# Patient Record
Sex: MALE | Race: WHITE | Employment: OTHER | URBAN - METROPOLITAN AREA
[De-identification: names, ages, dates, MRNs, and addresses within clinical notes are randomized per-mention and may not be internally consistent; named-entity substitution may affect disease eponyms.]

---

## 2018-04-19 ENCOUNTER — HOSPITAL ENCOUNTER (EMERGENCY)
Age: 53
Discharge: HOME | End: 2018-04-20
Attending: EMERGENCY MEDICINE
Payer: COMMERCIAL

## 2018-04-19 ENCOUNTER — APPOINTMENT (OUTPATIENT)
Dept: GENERAL RADIOLOGY | Age: 53
End: 2018-04-19
Attending: EMERGENCY MEDICINE
Payer: COMMERCIAL

## 2018-04-19 DIAGNOSIS — R07.9 CHEST PAIN, UNSPECIFIED TYPE: Primary | ICD-10-CM

## 2018-04-19 DIAGNOSIS — R03.0 ELEVATED BLOOD PRESSURE READING: ICD-10-CM

## 2018-04-19 LAB
ALBUMIN SERPL-MCNC: 4.3 G/DL (ref 3.5–5)
ALBUMIN/GLOB SERPL: 1.2 {RATIO} (ref 1.1–2.2)
ALP SERPL-CCNC: 75 U/L (ref 45–117)
ALT SERPL-CCNC: 36 U/L (ref 12–78)
ANION GAP SERPL CALC-SCNC: 7 MMOL/L (ref 5–15)
AST SERPL-CCNC: 20 U/L (ref 15–37)
BASOPHILS # BLD: 0 K/UL (ref 0–0.1)
BASOPHILS NFR BLD: 0 % (ref 0–1)
BILIRUB SERPL-MCNC: 0.5 MG/DL (ref 0.2–1)
BUN SERPL-MCNC: 11 MG/DL (ref 6–20)
BUN/CREAT SERPL: 10 (ref 12–20)
CALCIUM SERPL-MCNC: 9.1 MG/DL (ref 8.5–10.1)
CHLORIDE SERPL-SCNC: 105 MMOL/L (ref 97–108)
CK MB CFR SERPL CALC: 1 % (ref 0–2.5)
CK MB SERPL-MCNC: 1.2 NG/ML (ref 5–25)
CK SERPL-CCNC: 117 U/L (ref 39–308)
CO2 SERPL-SCNC: 28 MMOL/L (ref 21–32)
CREAT SERPL-MCNC: 1.12 MG/DL (ref 0.7–1.3)
D DIMER PPP FEU-MCNC: 0.22 MG/L FEU (ref 0–0.65)
DIFFERENTIAL METHOD BLD: ABNORMAL
EOSINOPHIL # BLD: 0 K/UL (ref 0–0.4)
EOSINOPHIL NFR BLD: 1 % (ref 0–7)
ERYTHROCYTE [DISTWIDTH] IN BLOOD BY AUTOMATED COUNT: 12.5 % (ref 11.5–14.5)
GLOBULIN SER CALC-MCNC: 3.7 G/DL (ref 2–4)
GLUCOSE SERPL-MCNC: 97 MG/DL (ref 65–100)
HCT VFR BLD AUTO: 44.3 % (ref 36.6–50.3)
HGB BLD-MCNC: 14.9 G/DL (ref 12.1–17)
IMM GRANULOCYTES # BLD: 0 K/UL (ref 0–0.04)
IMM GRANULOCYTES NFR BLD AUTO: 1 % (ref 0–0.5)
LYMPHOCYTES # BLD: 1.8 K/UL (ref 0.8–3.5)
LYMPHOCYTES NFR BLD: 20 % (ref 12–49)
MCH RBC QN AUTO: 29.3 PG (ref 26–34)
MCHC RBC AUTO-ENTMCNC: 33.6 G/DL (ref 30–36.5)
MCV RBC AUTO: 87 FL (ref 80–99)
MONOCYTES # BLD: 0.6 K/UL (ref 0–1)
MONOCYTES NFR BLD: 7 % (ref 5–13)
NEUTS SEG # BLD: 6.1 K/UL (ref 1.8–8)
NEUTS SEG NFR BLD: 71 % (ref 32–75)
NRBC # BLD: 0 K/UL (ref 0–0.01)
NRBC BLD-RTO: 0 PER 100 WBC
PLATELET # BLD AUTO: 241 K/UL (ref 150–400)
PMV BLD AUTO: 9.8 FL (ref 8.9–12.9)
POTASSIUM SERPL-SCNC: 4.1 MMOL/L (ref 3.5–5.1)
PROT SERPL-MCNC: 8 G/DL (ref 6.4–8.2)
RBC # BLD AUTO: 5.09 M/UL (ref 4.1–5.7)
SODIUM SERPL-SCNC: 140 MMOL/L (ref 136–145)
TROPONIN I SERPL-MCNC: <0.04 NG/ML
WBC # BLD AUTO: 8.6 K/UL (ref 4.1–11.1)

## 2018-04-19 PROCEDURE — 84484 ASSAY OF TROPONIN QUANT: CPT | Performed by: EMERGENCY MEDICINE

## 2018-04-19 PROCEDURE — 71046 X-RAY EXAM CHEST 2 VIEWS: CPT

## 2018-04-19 PROCEDURE — 99285 EMERGENCY DEPT VISIT HI MDM: CPT

## 2018-04-19 PROCEDURE — 82550 ASSAY OF CK (CPK): CPT | Performed by: EMERGENCY MEDICINE

## 2018-04-19 PROCEDURE — 85379 FIBRIN DEGRADATION QUANT: CPT | Performed by: EMERGENCY MEDICINE

## 2018-04-19 PROCEDURE — 36415 COLL VENOUS BLD VENIPUNCTURE: CPT | Performed by: EMERGENCY MEDICINE

## 2018-04-19 PROCEDURE — 80053 COMPREHEN METABOLIC PANEL: CPT | Performed by: EMERGENCY MEDICINE

## 2018-04-19 PROCEDURE — 93005 ELECTROCARDIOGRAM TRACING: CPT

## 2018-04-19 PROCEDURE — 85025 COMPLETE CBC W/AUTO DIFF WBC: CPT | Performed by: EMERGENCY MEDICINE

## 2018-04-19 PROCEDURE — 74011250637 HC RX REV CODE- 250/637: Performed by: EMERGENCY MEDICINE

## 2018-04-19 RX ORDER — FAMOTIDINE 20 MG/1
20 TABLET, FILM COATED ORAL
Status: COMPLETED | OUTPATIENT
Start: 2018-04-19 | End: 2018-04-19

## 2018-04-19 RX ADMIN — FAMOTIDINE 20 MG: 20 TABLET, FILM COATED ORAL at 22:04

## 2018-04-19 NOTE — ED PROVIDER NOTES
EMERGENCY DEPARTMENT HISTORY AND PHYSICAL EXAM      Date: 4/19/2018  Patient Name: Kt Carolina     PROVIDER IN TRIAGE NOTE:  7:14 PM  LUCÍA Sinclair  has evaluated the patient as the Provider in Triage (PIT) for chest pain. The vital signs and the triage nurse assessment have been reviewed. The patient and any available family have been advised that the appropriate studies have been ordered to initiate the work up based on the clinical presentation during the assessment. The pt has been advised that they will be accommodated in ED bed as soon as possible. The pt has been requested to contact the triage nurse or PIT immediately if they experiences any changes in their condition during this brief waiting period. Written by Sukumar Zimmerman ED Scribe, as dictated by Ghada Burleson .      History of Presenting Illness     Chief Complaint   Patient presents with    Chest Pain     The patient reports chest pain that started two hours ago, reports riding in his truck when it started. Reports pain in neck and right arm. Denies shortness of breath, reports pain is constant. Reports nausea, no vomiting. Reports blurry vision when chest pain started. Numbness or tingling in the neck, and center of back.  Blurred Vision       History Provided By: Patient    HPI: Kt Carolina, 46 y.o. male with PMHx significant for hypothyroidism, anxiety, and arthritis, presents ambulatory to the ED with cc of constant, mild to moderate, mid sternal CP that intermittently radiates up to anterior neck since 4:30 PM when he was getting into his car. He denies any alleviating or exacerbating factors. He denies any associated jaw pain or UE pain despite triage note. He denies any pain on exertion or pleuritic pain. Pt also reports mild anxiety and denies relief with Lorazepam x1 tab. Pt specifically denies any N/V, diaphoresis, syncope, or SOB.  He notes some blurred vision since onset of symptoms, denies field cuts or visual loss.     PCP: Coty Chanel MD    There are no other complaints, changes, or physical findings at this time. Current Facility-Administered Medications   Medication Dose Route Frequency Provider Last Rate Last Dose    metoprolol tartrate (LOPRESSOR) 25 mg tablet              Current Outpatient Prescriptions   Medication Sig Dispense Refill    aspirin 81 mg chewable tablet Take 1 Tab by mouth daily for 14 days. Indications: myocardial infarction prevention 14 Tab 0    metoprolol tartrate (LOPRESSOR) 25 mg tablet Take 1 Tab by mouth two (2) times a day for 14 days. 28 Tab 0    raNITIdine (ZANTAC) 150 mg tablet Take 1 Tab by mouth nightly for 14 days. 14 Tab 0    nitroglycerin (NITROSTAT) 0.4 mg SL tablet Take 1 Tab by mouth every five (5) minutes as needed for Chest Pain. Sit/Lay down then put one tab under the tongue every 5 minutes as needed for chest pain for 3 doses 1 Bottle 0    busPIRone (BUSPAR) 10 mg tablet Take 10 mg by mouth three (3) times daily.  clonazePAM (KLONOPIN) 1 mg tablet Take 1 mg by mouth nightly as needed.  methadone (DOLOPHINE) 10 mg tablet Take 10 mg by mouth every six (6) hours as needed for Pain. 1 AM 2 Noon 1 QHS      citalopram (CELEXA) 20 mg tablet Take  by mouth daily. Past History     Past Medical History:  Past Medical History:   Diagnosis Date    Arthritis     Endocrine disease     Hypothyroid    Hypertension     Ill-defined condition     Timbo's Disease    Ill-defined condition     Anxiety       Past Surgical History:  Past Surgical History:   Procedure Laterality Date    HX ORTHOPAEDIC      Knee    HX ORTHOPAEDIC      Hip       Family History:  History reviewed. No pertinent family history.     Social History:  Social History   Substance Use Topics    Smoking status: Never Smoker    Smokeless tobacco: Never Used    Alcohol use No       Allergies:  No Known Allergies      Review of Systems   Review of Systems   Constitutional: Negative for chills, diaphoresis and fever. HENT: Negative for congestion. Eyes: Negative for visual disturbance. Respiratory: Negative for chest tightness and shortness of breath. Cardiovascular: Positive for chest pain. Negative for leg swelling. Gastrointestinal: Negative for abdominal pain, nausea and vomiting. Endocrine: Negative for polyuria. Genitourinary: Negative for dysuria and frequency. Musculoskeletal: Negative for myalgias. Skin: Negative for color change. Allergic/Immunologic: Negative for immunocompromised state. Neurological: Negative for numbness. Psychiatric/Behavioral: The patient is nervous/anxious. Physical Exam   Physical Exam  Nursing note and vitals reviewed. General appearance: Alert, non-toxic, anxious  Eyes: Pupils equal, round and reactive to light, conjunctiva normal, anicteric sclera. HEENT: Mucous membranes tacky, oropharynx is benign. Pulmonary: Clear to auscultation bilaterally. Cardiac: Normal rate and regular rhythm, no murmurs, gallops, or rubs. 2+ radial pulses. Abdomen: Soft, nontender, nondistended, bowel sounds present. MSK: No pretibial edema, mild TTP anterior chest wall, reproduced with RUE horizontal adduction  Skin: Capillary refill < 3 seconds.   Neuro: Alert, answers questions appropriately,  symmetric, strength intact all extremities, light touch intact all extremities  Written by Loretta Baez, ED Scribe, as dictated by Ibeth Silverio MD.    Diagnostic Study Results     Labs -     Recent Results (from the past 12 hour(s))   EKG, 12 LEAD, INITIAL    Collection Time: 04/19/18  7:16 PM   Result Value Ref Range    Ventricular Rate 84 BPM    Atrial Rate 85 BPM    P-R Interval 148 ms    QRS Duration 94 ms    Q-T Interval 378 ms    QTC Calculation (Bezet) 446 ms    Calculated P Axis 25 degrees    Calculated R Axis 22 degrees    Calculated T Axis 18 degrees    Diagnosis       Normal sinus rhythm  Normal ECG  No previous ECGs available     CBC WITH AUTOMATED DIFF    Collection Time: 04/19/18  7:22 PM   Result Value Ref Range    WBC 8.6 4.1 - 11.1 K/uL    RBC 5.09 4. 10 - 5.70 M/uL    HGB 14.9 12.1 - 17.0 g/dL    HCT 44.3 36.6 - 50.3 %    MCV 87.0 80.0 - 99.0 FL    MCH 29.3 26.0 - 34.0 PG    MCHC 33.6 30.0 - 36.5 g/dL    RDW 12.5 11.5 - 14.5 %    PLATELET 989 311 - 834 K/uL    MPV 9.8 8.9 - 12.9 FL    NRBC 0.0 0  WBC    ABSOLUTE NRBC 0.00 0.00 - 0.01 K/uL    NEUTROPHILS 71 32 - 75 %    LYMPHOCYTES 20 12 - 49 %    MONOCYTES 7 5 - 13 %    EOSINOPHILS 1 0 - 7 %    BASOPHILS 0 0 - 1 %    IMMATURE GRANULOCYTES 1 (H) 0.0 - 0.5 %    ABS. NEUTROPHILS 6.1 1.8 - 8.0 K/UL    ABS. LYMPHOCYTES 1.8 0.8 - 3.5 K/UL    ABS. MONOCYTES 0.6 0.0 - 1.0 K/UL    ABS. EOSINOPHILS 0.0 0.0 - 0.4 K/UL    ABS. BASOPHILS 0.0 0.0 - 0.1 K/UL    ABS. IMM. GRANS. 0.0 0.00 - 0.04 K/UL    DF AUTOMATED     METABOLIC PANEL, COMPREHENSIVE    Collection Time: 04/19/18  7:22 PM   Result Value Ref Range    Sodium 140 136 - 145 mmol/L    Potassium 4.1 3.5 - 5.1 mmol/L    Chloride 105 97 - 108 mmol/L    CO2 28 21 - 32 mmol/L    Anion gap 7 5 - 15 mmol/L    Glucose 97 65 - 100 mg/dL    BUN 11 6 - 20 MG/DL    Creatinine 1.12 0.70 - 1.30 MG/DL    BUN/Creatinine ratio 10 (L) 12 - 20      GFR est AA >60 >60 ml/min/1.73m2    GFR est non-AA >60 >60 ml/min/1.73m2    Calcium 9.1 8.5 - 10.1 MG/DL    Bilirubin, total 0.5 0.2 - 1.0 MG/DL    ALT (SGPT) 36 12 - 78 U/L    AST (SGOT) 20 15 - 37 U/L    Alk.  phosphatase 75 45 - 117 U/L    Protein, total 8.0 6.4 - 8.2 g/dL    Albumin 4.3 3.5 - 5.0 g/dL    Globulin 3.7 2.0 - 4.0 g/dL    A-G Ratio 1.2 1.1 - 2.2     TROPONIN I    Collection Time: 04/19/18  7:22 PM   Result Value Ref Range    Troponin-I, Qt. <0.04 <0.05 ng/mL   CK W/ CKMB & INDEX    Collection Time: 04/19/18  7:22 PM   Result Value Ref Range     39 - 308 U/L    CK - MB 1.2 <3.6 NG/ML    CK-MB Index 1.0 0 - 2.5     D DIMER    Collection Time: 04/19/18 10:15 PM   Result Value Ref Range D-dimer 0.22 0.00 - 0.65 mg/L FEU   TROPONIN I    Collection Time: 04/19/18 11:58 PM   Result Value Ref Range    Troponin-I, Qt. <0.04 <0.05 ng/mL   POC TROPONIN-I    Collection Time: 04/20/18 12:03 AM   Result Value Ref Range    Troponin-I (POC) <0.04 0.00 - 0.08 ng/mL       Radiologic Studies -   XR CHEST PA LAT   Final Result        CT Results  (Last 48 hours)    None        CXR Results  (Last 48 hours)               04/19/18 2324  XR CHEST PA LAT Final result    Impression:  IMPRESSION: No acute abnormality identified. There is a small nodule in the left   midlung may represent a granuloma. Comparison with prior studies would be   helpful. Narrative:  EXAM:  XR CHEST PA LAT       INDICATION:   CHEST PAIN       COMPARISON: None. FINDINGS: PA and lateral radiographs of the chest demonstrate lungs clear of an   acute process. There is a small nodule in the left mid lung. Xavi Dia Heart size is   upper limits of normal.  Bony structures are intact. Medical Decision Making   I am the first provider for this patient. I reviewed the vital signs, available nursing notes, past medical history, past surgical history, family history and social history. Vital Signs-Reviewed the patient's vital signs.   Patient Vitals for the past 12 hrs:   Temp Pulse Resp BP SpO2   04/20/18 0125 - 81 - (!) 149/98 -   04/20/18 0115 - 89 18 (!) 149/98 98 %   04/20/18 0100 - 79 16 (!) 145/96 97 %   04/20/18 0045 - 80 13 (!) 147/93 97 %   04/20/18 0001 - 86 13 (!) 139/92 96 %   04/19/18 2345 - 87 15 (!) 146/102 97 %   04/19/18 2300 - 79 11 (!) 136/97 98 %   04/19/18 2245 - 79 13 (!) 137/96 96 %   04/19/18 2230 - 82 13 129/90 96 %   04/19/18 2215 - 85 12 (!) 130/101 97 %   04/19/18 2210 - 83 11 (!) 154/101 96 %   04/19/18 2200 - 91 13 (!) 157/95 95 %   04/19/18 2145 - 84 9 (!) 150/95 96 %   04/19/18 2130 - 85 13 139/89 96 %   04/19/18 2115 - 85 14 (!) 144/94 96 %   04/19/18 2106 - 79 22 - 96 % 04/19/18 2101 - - - (!) 137/97 -   04/19/18 2046 - - - - 98 %   04/19/18 1914 98.4 °F (36.9 °C) 92 18 (!) 169/97 98 %       EKG interpretation: (Preliminary) 19:16  Rhythm: normal sinus rhythm; and regular . Rate (approx.): 84; Axis: normal; QRS interval: normal ; ST/T wave: no ST elevation, no ST depression;   VA interval 148 ms, QRS 94 ms, QTc 446 ms. Written by oLretta Baez ED Scribe, as dictated by Ibeth Silverio MD.      Records Reviewed: Nursing Notes, Old Medical Records, Previous electrocardiograms, Previous Radiology Studies and Previous Laboratory Studies    Provider Notes (Medical Decision Making):   DDx: Chest wall pain, PUD, GERD, anxiety, ACS; lower suspicion PE, TAD, pericarditis     Ekg/trop negative; pain onset >6 hours, now w/o pain, no symptoms w/ exertion. No sx to suggest pericarditis, doubt PE or TAD, d dimer negative. Etiology unclear, some reproducible sx on exam, pt appears anxious w/ hx of anxiety, no exertional sx in ED. Starting Asa, nitro, beta blocker. HEART score ~3, close outpatient f/u; pt agrees w/ and desires d/c since asymptomatic, ekg & labs negative. Advised careful return precautions. ED Course:   Initial assessment performed. The patients presenting problems have been discussed, and they are in agreement with the care plan formulated and outlined with them. I have encouraged them to ask questions as they arise throughout their visit. Consult Note:  1:08 AM  Carolina Vance. Adan Mendenhall MD spoke with Dr. Isidra Escalona  Specialty: Cardiology  Discussed pts hx, disposition, and available diagnostic and imaging results. Reviewed care plans. Consultant agrees with plans as outlined. States if workup is unremarkable then pt can follow up as outpatient as EKG w/o obvious ischemia, trop negative despite symptoms onset ~4:30 PM.     1:20 AM  Pt reexamined and ambulated around ED without reproduction of symptoms on multiple occasions.  Denies exertional pain or any residual pain at this point. Notes palpations. Agrees with plan for close follow up with cardiology. Will start on daily Asa, metoprolol BID, nitro SL prn any chest pain recurrence. As pt is asymptomatic now, ambulates w/o symptoms, case reviewed w/ cardiology who recommends outpatient f/u, pt & family agree w/ POC. Written by Sylvia Sagastume, ED Scribe, as dictated by Jerel Dooley. Niles Blizzard, MD.    Critical Care Time:   0    Disposition:  DISCHARGE NOTE  1:50 AM  The patient has been re-evaluated and is ready for discharge. Reviewed available results with patient. Counseled pt on diagnosis and care plan. Pt has expressed understanding, and all questions have been answered. Pt agrees with plan and agrees to follow up as recommended, or return to the ED if their symptoms worsen. Discharge instructions have been provided and explained to the pt, along with reasons to return to the ED. PLAN:  1. Current Discharge Medication List      START taking these medications    Details   aspirin 81 mg chewable tablet Take 1 Tab by mouth daily for 14 days. Indications: myocardial infarction prevention  Qty: 14 Tab, Refills: 0      metoprolol tartrate (LOPRESSOR) 25 mg tablet Take 1 Tab by mouth two (2) times a day for 14 days. Qty: 28 Tab, Refills: 0      raNITIdine (ZANTAC) 150 mg tablet Take 1 Tab by mouth nightly for 14 days. Qty: 14 Tab, Refills: 0      nitroglycerin (NITROSTAT) 0.4 mg SL tablet Take 1 Tab by mouth every five (5) minutes as needed for Chest Pain. Sit/Lay down then put one tab under the tongue every 5 minutes as needed for chest pain for 3 doses  Qty: 1 Bottle, Refills: 0         CONTINUE these medications which have NOT CHANGED    Details   busPIRone (BUSPAR) 10 mg tablet Take 10 mg by mouth three (3) times daily. clonazePAM (KLONOPIN) 1 mg tablet Take 1 mg by mouth nightly as needed.     Associated Diagnoses: Chronic back pain      methadone (DOLOPHINE) 10 mg tablet Take 10 mg by mouth every six (6) hours as needed for Pain. 1 AM 2 Noon 1 [facility]    Associated Diagnoses: Chronic back pain      citalopram (CELEXA) 20 mg tablet Take  by mouth daily. Associated Diagnoses: Adjustment disorder with anxiety           2. Follow-up Information     Follow up With Details Comments [address] Rachele Jack MD Schedule an appointment as soon as possible for a visit in 4 days  [address] [facility]  [phone]      [facility] EMERGENCY DEPT Go in 1 day If symptoms worsen, INCLUDING WORSENING OR DIFFERENT CHEST PAIN, VOMITING, PAIN WITH WALKING, FAINTING, OR OTHER SYMPTOMS THAT CONCERN YOU [address] [place] [address] Dr Niles Iraheta MD Schedule an appointment as soon as possible for a visit in 1 day [address]  [address]           Return to ED if worse     Diagnosis     Clinical Impression:   1. Chest pain, unspecified type    2. Elevated blood pressure reading        Attestations: This note is prepared by Camilo Diaz, acting as Scribe for [employer]. Georgene Scheuermann, MD.    [employer]. Georgene Scheuermann, MD: The scribe's documentation has been prepared under my direction and personally reviewed by me in its entirety. I confirm that the note above accurately reflects all work, treatment, procedures, and medical decision making performed by me.

## 2018-04-20 ENCOUNTER — HOSPITAL ENCOUNTER (OUTPATIENT)
Age: 53
Setting detail: OBSERVATION
Discharge: HOME | End: 2018-04-20
Attending: EMERGENCY MEDICINE | Admitting: INTERNAL MEDICINE
Payer: COMMERCIAL

## 2018-04-20 ENCOUNTER — OFFICE VISIT (OUTPATIENT)
Dept: CARDIOLOGY CLINIC | Age: 53
End: 2018-04-20

## 2018-04-20 ENCOUNTER — APPOINTMENT (OUTPATIENT)
Dept: GENERAL RADIOLOGY | Age: 53
End: 2018-04-20
Attending: EMERGENCY MEDICINE
Payer: COMMERCIAL

## 2018-04-20 ENCOUNTER — TELEPHONE (OUTPATIENT)
Dept: CARDIOLOGY CLINIC | Age: 53
End: 2018-04-20

## 2018-04-20 ENCOUNTER — APPOINTMENT (OUTPATIENT)
Dept: CT IMAGING | Age: 53
End: 2018-04-20
Attending: EMERGENCY MEDICINE
Payer: COMMERCIAL

## 2018-04-20 VITALS
SYSTOLIC BLOOD PRESSURE: 130 MMHG | OXYGEN SATURATION: 98 % | DIASTOLIC BLOOD PRESSURE: 98 MMHG | HEART RATE: 88 BPM | BODY MASS INDEX: 28.35 KG/M2 | WEIGHT: 198 LBS | HEIGHT: 70 IN | RESPIRATION RATE: 18 BRPM

## 2018-04-20 VITALS
HEART RATE: 88 BPM | HEIGHT: 70 IN | BODY MASS INDEX: 28.41 KG/M2 | OXYGEN SATURATION: 98 % | TEMPERATURE: 98.4 F | SYSTOLIC BLOOD PRESSURE: 150 MMHG | RESPIRATION RATE: 15 BRPM | WEIGHT: 198.41 LBS | DIASTOLIC BLOOD PRESSURE: 89 MMHG

## 2018-04-20 VITALS
DIASTOLIC BLOOD PRESSURE: 95 MMHG | RESPIRATION RATE: 18 BRPM | OXYGEN SATURATION: 97 % | SYSTOLIC BLOOD PRESSURE: 155 MMHG | BODY MASS INDEX: 28.35 KG/M2 | HEART RATE: 78 BPM | HEIGHT: 70 IN | WEIGHT: 198 LBS | TEMPERATURE: 97.8 F

## 2018-04-20 DIAGNOSIS — M15.9 PRIMARY OSTEOARTHRITIS INVOLVING MULTIPLE JOINTS: ICD-10-CM

## 2018-04-20 DIAGNOSIS — F43.22 ADJUSTMENT DISORDER WITH ANXIETY: ICD-10-CM

## 2018-04-20 DIAGNOSIS — Z82.49 FAMILY HISTORY OF ISCHEMIC HEART DISEASE: ICD-10-CM

## 2018-04-20 DIAGNOSIS — I10 HYPERTENSION, ESSENTIAL, BENIGN: ICD-10-CM

## 2018-04-20 DIAGNOSIS — Z87.442 HISTORY OF NEPHROLITHIASIS: ICD-10-CM

## 2018-04-20 DIAGNOSIS — E78.5 DYSLIPIDEMIA (HIGH LDL; LOW HDL): ICD-10-CM

## 2018-04-20 DIAGNOSIS — M54.9 CHRONIC MIDLINE BACK PAIN, UNSPECIFIED BACK LOCATION: ICD-10-CM

## 2018-04-20 DIAGNOSIS — G89.29 CHRONIC MIDLINE BACK PAIN, UNSPECIFIED BACK LOCATION: ICD-10-CM

## 2018-04-20 DIAGNOSIS — I20.0 UNSTABLE ANGINA (HCC): Primary | ICD-10-CM

## 2018-04-20 DIAGNOSIS — R07.9 CHEST PAIN WITH HIGH RISK OF ACUTE CORONARY SYNDROME: Primary | ICD-10-CM

## 2018-04-20 DIAGNOSIS — R00.2 RAPID PALPITATIONS: ICD-10-CM

## 2018-04-20 PROBLEM — R07.89 CHEST PAIN, ATYPICAL: Status: ACTIVE | Noted: 2018-04-20

## 2018-04-20 PROBLEM — Q24.5 MYOCARDIAL BRIDGE: Status: ACTIVE | Noted: 2018-04-20

## 2018-04-20 PROBLEM — I25.10 CAD (CORONARY ARTERY DISEASE), NATIVE CORONARY ARTERY: Status: ACTIVE | Noted: 2018-04-20

## 2018-04-20 LAB
ALBUMIN SERPL-MCNC: 4.1 G/DL (ref 3.5–5)
ALBUMIN/GLOB SERPL: 1.1 {RATIO} (ref 1.1–2.2)
ALP SERPL-CCNC: 80 U/L (ref 45–117)
ALT SERPL-CCNC: 34 U/L (ref 12–78)
ANION GAP SERPL CALC-SCNC: 6 MMOL/L (ref 5–15)
AST SERPL-CCNC: 16 U/L (ref 15–37)
ATRIAL RATE: 78 BPM
ATRIAL RATE: 85 BPM
BASOPHILS # BLD: 0 K/UL (ref 0–0.1)
BASOPHILS NFR BLD: 0 % (ref 0–1)
BILIRUB SERPL-MCNC: 0.6 MG/DL (ref 0.2–1)
BUN SERPL-MCNC: 13 MG/DL (ref 6–20)
BUN/CREAT SERPL: 11 (ref 12–20)
CALCIUM SERPL-MCNC: 9.4 MG/DL (ref 8.5–10.1)
CALCULATED P AXIS, ECG09: 25 DEGREES
CALCULATED P AXIS, ECG09: 27 DEGREES
CALCULATED R AXIS, ECG10: 22 DEGREES
CALCULATED R AXIS, ECG10: 24 DEGREES
CALCULATED T AXIS, ECG11: 18 DEGREES
CALCULATED T AXIS, ECG11: 22 DEGREES
CHLORIDE SERPL-SCNC: 102 MMOL/L (ref 97–108)
CK SERPL-CCNC: 113 U/L (ref 39–308)
CO2 SERPL-SCNC: 29 MMOL/L (ref 21–32)
CREAT SERPL-MCNC: 1.19 MG/DL (ref 0.7–1.3)
DIAGNOSIS, 93000: NORMAL
DIAGNOSIS, 93000: NORMAL
DIFFERENTIAL METHOD BLD: ABNORMAL
EOSINOPHIL # BLD: 0 K/UL (ref 0–0.4)
EOSINOPHIL NFR BLD: 0 % (ref 0–7)
ERYTHROCYTE [DISTWIDTH] IN BLOOD BY AUTOMATED COUNT: 12.4 % (ref 11.5–14.5)
GLOBULIN SER CALC-MCNC: 3.8 G/DL (ref 2–4)
GLUCOSE SERPL-MCNC: 105 MG/DL (ref 65–100)
HCT VFR BLD AUTO: 45 % (ref 36.6–50.3)
HGB BLD-MCNC: 15.3 G/DL (ref 12.1–17)
IMM GRANULOCYTES # BLD: 0 K/UL (ref 0–0.04)
IMM GRANULOCYTES NFR BLD AUTO: 0 % (ref 0–0.5)
LYMPHOCYTES # BLD: 1.2 K/UL (ref 0.8–3.5)
LYMPHOCYTES NFR BLD: 13 % (ref 12–49)
MCH RBC QN AUTO: 29.1 PG (ref 26–34)
MCHC RBC AUTO-ENTMCNC: 34 G/DL (ref 30–36.5)
MCV RBC AUTO: 85.7 FL (ref 80–99)
MONOCYTES # BLD: 0.6 K/UL (ref 0–1)
MONOCYTES NFR BLD: 6 % (ref 5–13)
NEUTS SEG # BLD: 7.6 K/UL (ref 1.8–8)
NEUTS SEG NFR BLD: 80 % (ref 32–75)
NRBC # BLD: 0 K/UL (ref 0–0.01)
NRBC BLD-RTO: 0 PER 100 WBC
P-R INTERVAL, ECG05: 142 MS
P-R INTERVAL, ECG05: 148 MS
PLATELET # BLD AUTO: 245 K/UL (ref 150–400)
PMV BLD AUTO: 9.5 FL (ref 8.9–12.9)
POTASSIUM SERPL-SCNC: 4.2 MMOL/L (ref 3.5–5.1)
PROT SERPL-MCNC: 7.9 G/DL (ref 6.4–8.2)
Q-T INTERVAL, ECG07: 378 MS
Q-T INTERVAL, ECG07: 406 MS
QRS DURATION, ECG06: 92 MS
QRS DURATION, ECG06: 94 MS
QTC CALCULATION (BEZET), ECG08: 446 MS
QTC CALCULATION (BEZET), ECG08: 462 MS
RBC # BLD AUTO: 5.25 M/UL (ref 4.1–5.7)
SODIUM SERPL-SCNC: 137 MMOL/L (ref 136–145)
TROPONIN I BLD-MCNC: <0.04 NG/ML (ref 0–0.08)
TROPONIN I SERPL-MCNC: <0.04 NG/ML
TROPONIN I SERPL-MCNC: <0.04 NG/ML
VENTRICULAR RATE, ECG03: 78 BPM
VENTRICULAR RATE, ECG03: 84 BPM
WBC # BLD AUTO: 9.5 K/UL (ref 4.1–11.1)

## 2018-04-20 PROCEDURE — 99218 HC RM OBSERVATION: CPT

## 2018-04-20 PROCEDURE — 77030010221 HC SPLNT WR POS TELE -B

## 2018-04-20 PROCEDURE — 80053 COMPREHEN METABOLIC PANEL: CPT | Performed by: EMERGENCY MEDICINE

## 2018-04-20 PROCEDURE — 74011250636 HC RX REV CODE- 250/636: Performed by: INTERNAL MEDICINE

## 2018-04-20 PROCEDURE — 74011636320 HC RX REV CODE- 636/320

## 2018-04-20 PROCEDURE — C1769 GUIDE WIRE: HCPCS

## 2018-04-20 PROCEDURE — 74011250637 HC RX REV CODE- 250/637: Performed by: EMERGENCY MEDICINE

## 2018-04-20 PROCEDURE — 99284 EMERGENCY DEPT VISIT MOD MDM: CPT

## 2018-04-20 PROCEDURE — 74011250636 HC RX REV CODE- 250/636

## 2018-04-20 PROCEDURE — C1894 INTRO/SHEATH, NON-LASER: HCPCS

## 2018-04-20 PROCEDURE — 74011000250 HC RX REV CODE- 250

## 2018-04-20 PROCEDURE — 84484 ASSAY OF TROPONIN QUANT: CPT | Performed by: EMERGENCY MEDICINE

## 2018-04-20 PROCEDURE — 84484 ASSAY OF TROPONIN QUANT: CPT

## 2018-04-20 PROCEDURE — 85025 COMPLETE CBC W/AUTO DIFF WBC: CPT | Performed by: EMERGENCY MEDICINE

## 2018-04-20 PROCEDURE — 93005 ELECTROCARDIOGRAM TRACING: CPT

## 2018-04-20 PROCEDURE — 71045 X-RAY EXAM CHEST 1 VIEW: CPT

## 2018-04-20 PROCEDURE — 82550 ASSAY OF CK (CPK): CPT | Performed by: EMERGENCY MEDICINE

## 2018-04-20 PROCEDURE — 77030008543 HC TBNG MON PRSS MRTM -A

## 2018-04-20 PROCEDURE — 99153 MOD SED SAME PHYS/QHP EA: CPT

## 2018-04-20 PROCEDURE — 74011250636 HC RX REV CODE- 250/636: Performed by: EMERGENCY MEDICINE

## 2018-04-20 PROCEDURE — 74011250637 HC RX REV CODE- 250/637: Performed by: NURSE PRACTITIONER

## 2018-04-20 PROCEDURE — 36415 COLL VENOUS BLD VENIPUNCTURE: CPT | Performed by: EMERGENCY MEDICINE

## 2018-04-20 PROCEDURE — 77030019569 HC BND COMPR RAD TERU -B

## 2018-04-20 PROCEDURE — 77030004549 HC CATH ANGI DX PRF MRTM -A

## 2018-04-20 PROCEDURE — 77030015766

## 2018-04-20 PROCEDURE — 96374 THER/PROPH/DIAG INJ IV PUSH: CPT

## 2018-04-20 PROCEDURE — 77030019698 HC SYR ANGI MDLON MRTM -A

## 2018-04-20 RX ORDER — HEPARIN SODIUM 1000 [USP'U]/ML
INJECTION, SOLUTION INTRAVENOUS; SUBCUTANEOUS
Status: COMPLETED
Start: 2018-04-20 | End: 2018-04-20

## 2018-04-20 RX ORDER — LORAZEPAM 2 MG/ML
1 INJECTION INTRAMUSCULAR ONCE
Status: COMPLETED | OUTPATIENT
Start: 2018-04-20 | End: 2018-04-20

## 2018-04-20 RX ORDER — SODIUM CHLORIDE 9 MG/ML
50 INJECTION, SOLUTION INTRAVENOUS
Status: DISCONTINUED | OUTPATIENT
Start: 2018-04-20 | End: 2018-04-20

## 2018-04-20 RX ORDER — FENTANYL CITRATE 50 UG/ML
INJECTION, SOLUTION INTRAMUSCULAR; INTRAVENOUS
Status: COMPLETED
Start: 2018-04-20 | End: 2018-04-20

## 2018-04-20 RX ORDER — METOPROLOL TARTRATE 25 MG/1
25 TABLET, FILM COATED ORAL
Status: COMPLETED | OUTPATIENT
Start: 2018-04-20 | End: 2018-04-20

## 2018-04-20 RX ORDER — NITROGLYCERIN 0.4 MG/1
0.4 TABLET SUBLINGUAL
Status: CANCELLED | OUTPATIENT
Start: 2018-04-20

## 2018-04-20 RX ORDER — HEPARIN SODIUM 200 [USP'U]/100ML
500 INJECTION, SOLUTION INTRAVENOUS ONCE
Status: COMPLETED | OUTPATIENT
Start: 2018-04-20 | End: 2018-04-20

## 2018-04-20 RX ORDER — RANITIDINE 150 MG/1
150 TABLET, FILM COATED ORAL
Qty: 14 TAB | Refills: 0 | Status: SHIPPED | OUTPATIENT
Start: 2018-04-20 | End: 2018-04-20

## 2018-04-20 RX ORDER — METOPROLOL TARTRATE 25 MG/1
25 TABLET, FILM COATED ORAL 2 TIMES DAILY
Qty: 180 TAB | Refills: 0 | Status: ON HOLD | OUTPATIENT
Start: 2018-04-20 | End: 2018-11-14

## 2018-04-20 RX ORDER — SODIUM CHLORIDE 0.9 % (FLUSH) 0.9 %
5-10 SYRINGE (ML) INJECTION AS NEEDED
Status: DISCONTINUED | OUTPATIENT
Start: 2018-04-20 | End: 2018-04-21 | Stop reason: HOSPADM

## 2018-04-20 RX ORDER — SODIUM CHLORIDE 0.9 % (FLUSH) 0.9 %
5-10 SYRINGE (ML) INJECTION EVERY 8 HOURS
Status: DISCONTINUED | OUTPATIENT
Start: 2018-04-20 | End: 2018-04-21 | Stop reason: HOSPADM

## 2018-04-20 RX ORDER — SODIUM CHLORIDE 0.9 % (FLUSH) 0.9 %
5-10 SYRINGE (ML) INJECTION AS NEEDED
Status: CANCELLED | OUTPATIENT
Start: 2018-04-20

## 2018-04-20 RX ORDER — SODIUM CHLORIDE 0.9 % (FLUSH) 0.9 %
10 SYRINGE (ML) INJECTION
Status: DISCONTINUED | OUTPATIENT
Start: 2018-04-20 | End: 2018-04-20

## 2018-04-20 RX ORDER — HEPARIN SODIUM 200 [USP'U]/100ML
INJECTION, SOLUTION INTRAVENOUS
Status: COMPLETED
Start: 2018-04-20 | End: 2018-04-20

## 2018-04-20 RX ORDER — LIDOCAINE HYDROCHLORIDE 10 MG/ML
INJECTION, SOLUTION EPIDURAL; INFILTRATION; INTRACAUDAL; PERINEURAL
Status: COMPLETED
Start: 2018-04-20 | End: 2018-04-20

## 2018-04-20 RX ORDER — LORAZEPAM 1 MG/1
1 TABLET ORAL
Status: CANCELLED | OUTPATIENT
Start: 2018-04-20

## 2018-04-20 RX ORDER — VERAPAMIL HYDROCHLORIDE 2.5 MG/ML
2.5 INJECTION, SOLUTION INTRAVENOUS ONCE
Status: COMPLETED | OUTPATIENT
Start: 2018-04-20 | End: 2018-04-20

## 2018-04-20 RX ORDER — METOPROLOL TARTRATE 25 MG/1
25 TABLET, FILM COATED ORAL 2 TIMES DAILY
Qty: 28 TAB | Refills: 0 | Status: ON HOLD | OUTPATIENT
Start: 2018-04-20 | End: 2018-04-20

## 2018-04-20 RX ORDER — LIDOCAINE HYDROCHLORIDE 10 MG/ML
1-30 INJECTION, SOLUTION EPIDURAL; INFILTRATION; INTRACAUDAL; PERINEURAL
Status: DISCONTINUED | OUTPATIENT
Start: 2018-04-20 | End: 2018-04-20

## 2018-04-20 RX ORDER — LORAZEPAM 1 MG/1
1 TABLET ORAL
Status: ON HOLD | COMMUNITY
End: 2018-11-14

## 2018-04-20 RX ORDER — SODIUM CHLORIDE 0.9 % (FLUSH) 0.9 %
5-10 SYRINGE (ML) INJECTION EVERY 8 HOURS
Status: CANCELLED | OUTPATIENT
Start: 2018-04-20

## 2018-04-20 RX ORDER — GUAIFENESIN 100 MG/5ML
81 LIQUID (ML) ORAL DAILY
Status: CANCELLED | OUTPATIENT
Start: 2018-04-21

## 2018-04-20 RX ORDER — ATORVASTATIN CALCIUM 20 MG/1
20 TABLET, FILM COATED ORAL DAILY
Qty: 90 TAB | Refills: 0 | Status: ON HOLD | OUTPATIENT
Start: 2018-04-20 | End: 2018-11-14

## 2018-04-20 RX ORDER — MIDAZOLAM HYDROCHLORIDE 1 MG/ML
INJECTION, SOLUTION INTRAMUSCULAR; INTRAVENOUS
Status: DISCONTINUED
Start: 2018-04-20 | End: 2018-04-20

## 2018-04-20 RX ORDER — HEPARIN SODIUM 1000 [USP'U]/ML
1000-10000 INJECTION, SOLUTION INTRAVENOUS; SUBCUTANEOUS
Status: DISCONTINUED | OUTPATIENT
Start: 2018-04-20 | End: 2018-04-20

## 2018-04-20 RX ORDER — FENTANYL CITRATE 50 UG/ML
25-50 INJECTION, SOLUTION INTRAMUSCULAR; INTRAVENOUS
Status: DISCONTINUED | OUTPATIENT
Start: 2018-04-20 | End: 2018-04-20

## 2018-04-20 RX ORDER — NITROGLYCERIN 0.4 MG/1
0.4 TABLET SUBLINGUAL
Qty: 1 BOTTLE | Refills: 0 | Status: ON HOLD | OUTPATIENT
Start: 2018-04-20 | End: 2018-11-14

## 2018-04-20 RX ORDER — FENTANYL CITRATE 50 UG/ML
INJECTION, SOLUTION INTRAMUSCULAR; INTRAVENOUS
Status: DISCONTINUED
Start: 2018-04-20 | End: 2018-04-20

## 2018-04-20 RX ORDER — RANITIDINE 150 MG/1
150 TABLET, FILM COATED ORAL 2 TIMES DAILY
Qty: 180 TAB | Refills: 0 | Status: ON HOLD | OUTPATIENT
Start: 2018-04-20 | End: 2018-11-14

## 2018-04-20 RX ORDER — VERAPAMIL HYDROCHLORIDE 2.5 MG/ML
INJECTION, SOLUTION INTRAVENOUS
Status: COMPLETED
Start: 2018-04-20 | End: 2018-04-20

## 2018-04-20 RX ORDER — SODIUM CHLORIDE 9 MG/ML
100 INJECTION, SOLUTION INTRAVENOUS CONTINUOUS
Status: CANCELLED | OUTPATIENT
Start: 2018-04-20 | End: 2018-04-21

## 2018-04-20 RX ORDER — METOPROLOL TARTRATE 25 MG/1
25 TABLET, FILM COATED ORAL 2 TIMES DAILY
Status: CANCELLED | OUTPATIENT
Start: 2018-04-20

## 2018-04-20 RX ORDER — METHADONE HYDROCHLORIDE 10 MG/1
10 TABLET ORAL
Status: CANCELLED | OUTPATIENT
Start: 2018-04-20

## 2018-04-20 RX ORDER — METOPROLOL TARTRATE 25 MG/1
12.5 TABLET, FILM COATED ORAL EVERY 12 HOURS
Status: DISCONTINUED | OUTPATIENT
Start: 2018-04-20 | End: 2018-04-21 | Stop reason: HOSPADM

## 2018-04-20 RX ORDER — METOPROLOL TARTRATE 25 MG/1
TABLET, FILM COATED ORAL
Status: DISCONTINUED
Start: 2018-04-20 | End: 2018-04-20 | Stop reason: HOSPADM

## 2018-04-20 RX ORDER — MIDAZOLAM HYDROCHLORIDE 1 MG/ML
.5-2 INJECTION, SOLUTION INTRAMUSCULAR; INTRAVENOUS
Status: DISCONTINUED | OUTPATIENT
Start: 2018-04-20 | End: 2018-04-20

## 2018-04-20 RX ORDER — MIDAZOLAM HYDROCHLORIDE 1 MG/ML
INJECTION, SOLUTION INTRAMUSCULAR; INTRAVENOUS
Status: COMPLETED
Start: 2018-04-20 | End: 2018-04-20

## 2018-04-20 RX ORDER — ACETAMINOPHEN 325 MG/1
650 TABLET ORAL
Status: DISCONTINUED | OUTPATIENT
Start: 2018-04-20 | End: 2018-04-21 | Stop reason: HOSPADM

## 2018-04-20 RX ORDER — GUAIFENESIN 100 MG/5ML
81 LIQUID (ML) ORAL DAILY
Qty: 14 TAB | Refills: 0 | Status: SHIPPED | OUTPATIENT
Start: 2018-04-20 | End: 2018-05-04

## 2018-04-20 RX ORDER — KETOROLAC TROMETHAMINE 30 MG/ML
15 INJECTION, SOLUTION INTRAMUSCULAR; INTRAVENOUS
Status: COMPLETED | OUTPATIENT
Start: 2018-04-20 | End: 2018-04-20

## 2018-04-20 RX ORDER — BUSPIRONE HYDROCHLORIDE 10 MG/1
10 TABLET ORAL 3 TIMES DAILY
Status: CANCELLED | OUTPATIENT
Start: 2018-04-20

## 2018-04-20 RX ADMIN — METOPROLOL TARTRATE 25 MG: 25 TABLET ORAL at 01:25

## 2018-04-20 RX ADMIN — VERAPAMIL HYDROCHLORIDE 2.5 MG: 2.5 INJECTION INTRAVENOUS at 16:24

## 2018-04-20 RX ADMIN — HEPARIN SODIUM 1000 UNITS: 200 INJECTION, SOLUTION INTRAVENOUS at 16:08

## 2018-04-20 RX ADMIN — MIDAZOLAM HYDROCHLORIDE 2 MG: 1 INJECTION, SOLUTION INTRAMUSCULAR; INTRAVENOUS at 16:15

## 2018-04-20 RX ADMIN — FENTANYL CITRATE 25 MCG: 50 INJECTION, SOLUTION INTRAMUSCULAR; INTRAVENOUS at 16:00

## 2018-04-20 RX ADMIN — LIDOCAINE HYDROCHLORIDE 1 ML: 10 INJECTION, SOLUTION EPIDURAL; INFILTRATION; INTRACAUDAL; PERINEURAL at 16:22

## 2018-04-20 RX ADMIN — KETOROLAC TROMETHAMINE 15 MG: 30 INJECTION, SOLUTION INTRAMUSCULAR at 00:25

## 2018-04-20 RX ADMIN — HEPARIN SODIUM 2500 UNITS: 1000 INJECTION, SOLUTION INTRAVENOUS; SUBCUTANEOUS at 16:23

## 2018-04-20 RX ADMIN — FENTANYL CITRATE 50 MCG: 50 INJECTION, SOLUTION INTRAMUSCULAR; INTRAVENOUS at 16:19

## 2018-04-20 RX ADMIN — METOPROLOL TARTRATE 12.5 MG: 25 TABLET ORAL at 18:09

## 2018-04-20 RX ADMIN — ACETAMINOPHEN 650 MG: 325 TABLET ORAL at 18:11

## 2018-04-20 RX ADMIN — NITROGLYCERIN 200 MCG: 5 INJECTION, SOLUTION INTRAVENOUS at 16:23

## 2018-04-20 RX ADMIN — MIDAZOLAM HYDROCHLORIDE 2 MG: 1 INJECTION, SOLUTION INTRAMUSCULAR; INTRAVENOUS at 15:59

## 2018-04-20 RX ADMIN — IOPAMIDOL 50 ML: 755 INJECTION, SOLUTION INTRAVENOUS at 16:39

## 2018-04-20 RX ADMIN — MIDAZOLAM 2 MG: 1 INJECTION INTRAMUSCULAR; INTRAVENOUS at 15:59

## 2018-04-20 RX ADMIN — FENTANYL CITRATE 25 MCG: 50 INJECTION, SOLUTION INTRAMUSCULAR; INTRAVENOUS at 16:15

## 2018-04-20 RX ADMIN — VERAPAMIL HYDROCHLORIDE 2.5 MG: 2.5 INJECTION, SOLUTION INTRAVENOUS at 16:24

## 2018-04-20 RX ADMIN — HEPARIN SODIUM 1000 UNITS: 200 INJECTION, SOLUTION INTRAVENOUS at 16:07

## 2018-04-20 RX ADMIN — MIDAZOLAM HYDROCHLORIDE 1 MG: 1 INJECTION, SOLUTION INTRAMUSCULAR; INTRAVENOUS at 16:19

## 2018-04-20 RX ADMIN — LORAZEPAM 1 MG: 2 INJECTION INTRAMUSCULAR; INTRAVENOUS at 20:33

## 2018-04-20 NOTE — PROGRESS NOTES
1. Have you been to the ER, urgent care clinic since your last visit? Hospitalized since your last visit? ED [facility] ER last night for CP. 2. Have you seen or consulted any other health care providers outside of the [address] [place] [address] since your last visit? Include any pap smears or colon screening. No    Chief Complaint   Patient presents with    Chest Pain     C/O CP rest or exertion.      NTG 0.4 mg sl given 1:20 PM

## 2018-04-20 NOTE — H&P
[address], [address] [facility]  [phone]          CARDIOLOGY HISTORY AND PHYSICAL    Date of  Admission: 4/20/2018  2:10 PM     Admission type:Emergency     Subjective:      Best Johnson is a 46 y.o. male admitted for Unstable angina ([facility] [address] No previous cardiac hx, +fam hx with early death from CAD. Seen in [address] [facility] ED last evening, discharged after neg troponin x 2 and neg ECG. Scheduled to see Dr. José Miguel Mayen this AM.  Cuca Chun early as he began to have further lower sternal chest discomfort, stabbing pain. Back to ED with neg ECG and troponin x 1. Cardiac risk factors: family history. Patient Active Problem List    Diagnosis Date Noted    Chest pain, atypical 04/20/2018    [facility] 04/20/2018    Family history of ischemic heart disease 04/20/2018    Rapid palpitations 06/07/2016    Dyslipidemia (high LDL; low HDL) 06/27/2014    Chronic back pain 06/27/2014    DJD (degenerative joint disease) 06/27/2014    Hypertension, essential, benign 06/27/2014    Adjustment disorder with anxiety 06/27/2014    History of nephrolithiasis--s/p lithotripsy 06/27/2014    Dizziness 06/27/2014      Ghassan Pruitt MD  Past Medical History:   Diagnosis Date    Arthritis     Endocrine disease     Hypothyroid    Hypertension     Ill-defined condition     Timbo's Disease    Ill-defined condition     Anxiety      Social History     Social History    Marital status:      Spouse name: N/A    Number of children: N/A    Years of education: N/A     Social History Main Topics    Smoking status: Never Smoker    Smokeless tobacco: Never Used    Alcohol use No    Drug use: No    Sexual activity: Not on file     Other Topics Concern    Not on file     Social History Narrative     No Known Allergies   No family history on file.    Current Facility-Administered Medications   Medication Dose Route Frequency    metoprolol tartrate (LOPRESSOR) tablet 12.5 mg  12.5 mg Oral Q12H         Review of Symptoms:  Constitutional: negative  Eyes: negative  Ears, nose, mouth, throat, and face: negative  Respiratory: negative  Cardiovascular: new onset of stabbing chest pain, lower sternum   Gastrointestinal: negative  Genitourinary:negative  Musculoskeletal:chronic back pain  Neurological: negative  Behvioral/Psych: negative  Endocrine: negative     Objective:   Physical Exam:  General Appearance:  WNWD  male in no acute distress  Chest:   Clear  Cardiovascular:  Regular rate and rhythm, no murmur.   Abdomen:   Soft, non-tender, bowel sounds are active.   Extremities: no peripheral edema  Skin:  Warm and dry.     Visit Vitals    /85 (BP 1 Location: Right arm, BP Patient Position: At rest)    Pulse 75    Temp 98 °F (36.7 °C)    Resp 22    Ht 5' 10\" (1.778 m)    Wt 198 lb (89.8 kg)    SpO2 98%    BMI 28.41 kg/m2       Cardiographics    Telemetry: SR-ST  ECG: SR no acute change      Labs:  Recent Labs      04/20/18   1424  04/19/18   1922   WBC  9.5  8.6   HGB  15.3  14.9   HCT  45.0  44.3   PLT  245  241     Recent Labs      04/20/18   1424  04/19/18   1922   NA  137  140   K  4.2  4.1   CL  102  105   CO2  29  28   GLU  105*  97   BUN  13  11   CREA  1.19  1.12   CA  9.4  9.1   ALB  4.1  4.3   TBILI  0.6  0.5   SGOT  16  20   ALT  34  36       Recent Labs      04/20/18   1424  04/19/18   2358  04/19/18   [address]  <0.04  <0.04  <0.04   CPK   --    --   117   CKMB   --    --   1.2          Assessment:       Active Problems:    Dyslipidemia (high LDL; low HDL) (6/27/2014)      Hypertension, essential, benign (6/27/2014)      Unstable angina ([address] (4/20/2018)      Family history of ischemic heart disease (4/20/2018)         Plan:     [place]:  Very concerning with ongoing chest discomfort/pain and strong family hx  Received ASA this AM, giving metoprolol now for BP control.    Dr. Mikey Madden and I discussed the risks/benefits/alternatives of cardiac catheterization +/- PCI with the patient. Risks include (but are not limited to) bleeding, infection, cva/mi/tamponade/death. The patient understands and wishes to proceed. Patient seen and examined by me with nurse practitioner Tila Alvarez. I personally performed all components of the history, physical, and medical decision making and agree with the assessment and plan with minor modifications as noted.

## 2018-04-20 NOTE — DISCHARGE INSTRUCTIONS
Elevated Blood Pressure: Care Instructions  Your Care Instructions    Blood pressure is a measure of how hard the blood pushes against the walls of your arteries. It's normal for blood pressure to go up and down throughout the day. But if it stays up over time, you have high blood pressure. Two numbers tell you your blood pressure. The first number is the systolic pressure. It shows how hard the blood pushes when your heart is pumping. The second number is the diastolic pressure. It shows how hard the blood pushes between heartbeats, when your heart is relaxed and filling with blood. An ideal blood pressure in adults is less than 120/80 (say \"120 over 80\"). High blood pressure is 140/90 or higher. You have high blood pressure if your top number is 140 or higher or your bottom number is 90 or higher, or both. The main test for high blood pressure is simple, fast, and painless. To diagnose high blood pressure, your doctor will test your blood pressure at different times. After testing your blood pressure, your doctor may ask you to test it again when you are home. If you are diagnosed with high blood pressure, you can work with your doctor to make a long-term plan to manage it. Follow-up care is a key part of your treatment and safety. Be sure to make and go to all appointments, and call your doctor if you are having problems. It's also a good idea to know your test results and keep a list of the medicines you take. How can you care for yourself at home? · Do not smoke. Smoking increases your risk for heart attack and stroke. If you need help quitting, talk to your doctor about stop-smoking programs and medicines. These can increase your chances of quitting for good. · Stay at a healthy weight. · Try to limit how much sodium you eat to less than 2,300 milligrams (mg) a day. Your doctor may ask you to try to eat less than 1,500 mg a day. · Be physically active.  Get at least 30 minutes of exercise on most days of the week. Walking is a good choice. You also may want to do other activities, such as running, swimming, cycling, or playing tennis or team sports. · Avoid or limit alcohol. Talk to your doctor about whether you can drink any alcohol. · Eat plenty of fruits, vegetables, and low-fat dairy products. Eat less saturated and total fats. · Learn how to check your blood pressure at home. When should you call for help? Call your doctor now or seek immediate medical care if:  ? · Your blood pressure is much higher than normal (such as 180/110 or higher). ? · You think high blood pressure is causing symptoms such as:  ¨ Severe headache. ¨ Blurry vision. ? Watch closely for changes in your health, and be sure to contact your doctor if:  ? · You do not get better as expected. Where can you learn more? Go to http://fl[facility] Medicalmoises.info/. Enter B997 in the search box to learn more about \"Elevated Blood Pressure: Care Instructions. \"  Current as of: September 21, 2016  Content Version: 11.4  © 5896-7907 [employer]. Care instructions adapted under license by [employer] (which disclaims liability or warranty for this information). If you have questions about a medical condition or this instruction, always ask your healthcare professional. [address] any warranty or liability for your use of this information. Chest Pain: Care Instructions  Your Care Instructions    There are many things that can cause chest pain. Some are not serious and will get better on their own in a few days. But some kinds of chest pain need more testing and treatment. Your doctor may have recommended a follow-up visit in the next 8 to 12 hours. If you are not getting better, you may need more tests or treatment. Even though your doctor has released you, you still need to watch for any problems.  The doctor carefully checked you, but sometimes problems can develop later. If you have new symptoms or if your symptoms do not get better, get medical care right away. If you have worse or different chest pain or pressure that lasts more than 5 minutes or you passed out (lost consciousness), call 911 or seek other emergency help right away. A medical visit is only one step in your treatment. Even if you feel better, you still need to do what your doctor recommends, such as going to all suggested follow-up appointments and taking medicines exactly as directed. This will help you recover and help prevent future problems. How can you care for yourself at home? · Rest until you feel better. · Take your medicine exactly as prescribed. Call your doctor if you think you are having a problem with your medicine. · Do not drive after taking a prescription pain medicine. When should you call for help? Call 911 if:  ? · You passed out (lost consciousness). ? · You have severe difficulty breathing. ? · You have symptoms of a heart attack. These may include:  ¨ Chest pain or pressure, or a strange feeling in your chest.  ¨ Sweating. ¨ Shortness of breath. ¨ Nausea or vomiting. ¨ Pain, pressure, or a strange feeling in your back, neck, jaw, or upper belly or in one or both shoulders or arms. ¨ Lightheadedness or sudden weakness. ¨ A fast or irregular heartbeat. After you call 911, the  may tell you to chew 1 adult-strength or 2 to 4 low-dose aspirin. Wait for an ambulance. Do not try to drive yourself. ?Call your doctor today if:  ? · You have any trouble breathing. ? · Your chest pain gets worse. ? · You are dizzy or lightheaded, or you feel like you may faint. ? · You are not getting better as expected. ? · You are having new or different chest pain. Where can you learn more? Go to http://floyd-moises.info/. Enter A120 in the search box to learn more about \"Chest Pain: Care Instructions. \"  Current as of: March 20, 2017  Content Version: 11.4  © 5140-8196 [employer]. Care instructions adapted under license by [employer] (which disclaims liability or warranty for this information). If you have questions about a medical condition or this instruction, always ask your healthcare professional. [address] any warranty or liability for your use of this information.

## 2018-04-20 NOTE — PROGRESS NOTES
[id number] [facility]        [phone]                             NEW PATIENT HPI/FOLLOW-UP    NAME:  Uvaldo García   :   1965   MRN:   [id number]   PCP:  Bertha Steinberg MD           Subjective: The patient is a 46y.o. year old male non-smoker with PMHx of TAMIE/panic attacks,adjustment disorder since childhood requiring maintenance Rx, dyslipidemia with low HDL, FHx early CAD/mi/sudden death, osteoarthritis--on methadone for chronic back pain, hypothyroidism, HTN  who presents for evaluation of intermittent recurring substernal chest pain radiating into left arm and into throat and lower jaw over last few days/weeks. Is associated with \"pounding\" heart beats. Occurs at anytime but usually with activity. Duration variable lasting upwards for minutes to hrs. Was evaluated in ED [facility] yesterday. W/U negative. Discharged after 6-8 hrs. Chest pain has persisted on and off through the night. Advised to present here today for further tx. Denies edema, medication intolerance, palpitations, shortness of breath, PND/orthopnea wheezing, sputum, syncope, dizziness or light headedness. Review of Systems  General: Pt denies excessive weight gain or loss. Pt is able to conduct ADL's. Respiratory: Denies shortness of breath, MANDUJANO, wheezing or stridor.   Cardiovascular: +precordial pain, -palpitations, edema or PND  Gastrointestinal: Denies poor appetite, +indigestion, -abdominal pain or blood in stool  Peripheral vascular: Denies claudication, leg cramps  Neurological: Denies paresthesias, tingling.numbness  Psychiatric: ++++anxiety,depression,fatigue  Musculoskeletal: + diffuse pain,tenderness, soreness,-swelling      Past Medical History:   Diagnosis Date    Arthritis     Endocrine disease     Hypothyroid    Hypertension     Ill-defined condition     Timbo's Disease    Ill-defined condition     Anxiety     Patient Active Problem List    Diagnosis Date Noted  Chest pain, atypical 04/20/2018    Rapid palpitations 06/07/2016    Dyslipidemia (high LDL; low HDL) 06/27/2014    Chronic back pain 06/27/2014    DJD (degenerative joint disease) 06/27/2014    Hypertension, essential, benign 06/27/2014    Adjustment disorder with anxiety 06/27/2014    History of nephrolithiasis--s/p lithotripsy 06/27/2014    Dizziness 06/27/2014      Past Surgical History:   Procedure Laterality Date    HX ORTHOPAEDIC      Knee    HX ORTHOPAEDIC      Hip     No Known Allergies   No family history on file. Social History     Social History    Marital status:      Spouse name: N/A    Number of children: N/A    Years of education: N/A     Occupational History    Not on file. Social History Main Topics    Smoking status: Never Smoker    Smokeless tobacco: Never Used    Alcohol use No    Drug use: No    Sexual activity: Not on file     Other Topics Concern    Not on file     Social History Narrative      Current Outpatient Prescriptions   Medication Sig    aspirin 81 mg chewable tablet Take 1 Tab by mouth daily for 14 days. Indications: myocardial infarction prevention    metoprolol tartrate (LOPRESSOR) 25 mg tablet Take 1 Tab by mouth two (2) times a day for 14 days.  raNITIdine (ZANTAC) 150 mg tablet Take 1 Tab by mouth nightly for 14 days.  nitroglycerin (NITROSTAT) 0.4 mg SL tablet Take 1 Tab by mouth every five (5) minutes as needed for Chest Pain. Sit/Lay down then put one tab under the tongue every 5 minutes as needed for chest pain for 3 doses    busPIRone (BUSPAR) 10 mg tablet Take 10 mg by mouth three (3) times daily.  clonazePAM (KLONOPIN) 1 mg tablet Take 1 mg by mouth nightly as needed.  methadone (DOLOPHINE) 10 mg tablet Take 10 mg by mouth every six (6) hours as needed for Pain. 1 AM 2 Noon 1 QHS    citalopram (CELEXA) 20 mg tablet Take  by mouth daily. No current facility-administered medications for this visit.          I have reviewed the nurses notes, vitals, problem list, allergy list, medical history, family medical, social history and medications. Objective:     Physical Exam:     Vitals:    04/20/18 1301   BP: (!) 130/98   Pulse: 88   Resp: 18   SpO2: 98%   Weight: 198 lb (89.8 kg)   Height: 5' 10\" (1.778 m)    Body mass index is 28.41 kg/(m^2). General: Well developed, very anxious,crying complaining of persistent moderate to severe substernal chest pain. HEENT: No carotid bruits, no JVD, trach is midline. Heart:  Normal S1/S2 negative S3 or S4. Regular, no murmur, gallop or rub.   Respiratory: Clear bilaterally, no wheezing or rales  Abdomen:   Soft, non-tender, bowel sounds are active.   Extremities:  No edema, normal cap refill, no cyanosis. Neuro: A&Ox3, speech clear, gait stable. Skin: Skin color is normal. No rashes or lesions. No diaphoresis.   Vascular: 2+ pulses symmetric in all extremities        Data Review:       Cardiographics:    EKG: NSR,minimal diffuse ST segment elevation suggestive of early repol vs possible early acute epicardial ischemia/injury,pericarditis    Cardiology Labs:    Results for orders placed or performed during the hospital encounter of 04/19/18   EKG, 12 LEAD, INITIAL   Result Value Ref Range    Ventricular Rate 84 BPM    Atrial Rate 85 BPM    P-R Interval 148 ms    QRS Duration 94 ms    Q-T Interval 378 ms    QTC Calculation (Bezet) 446 ms    Calculated P Axis 25 degrees    Calculated R Axis 22 degrees    Calculated T Axis 18 degrees    Diagnosis       Normal sinus rhythm  Normal ECG  No previous ECGs available  Confirmed by Jessenia Garcia ([id number]) on 4/20/2018 8:47:06 AM         Lab Results   Component Value Date/Time    Cholesterol, total 197 06/29/2016 09:35 AM    HDL Cholesterol 35 (L) 06/29/2016 09:35 AM    LDL, calculated 121 (H) 06/29/2016 09:35 AM    Triglyceride 203 (H) 06/29/2016 09:35 AM       Lab Results   Component Value Date/Time    Sodium 140 04/19/2018 07:22 PM Potassium 4.1 04/19/2018 07:22 PM    Chloride 105 04/19/2018 07:22 PM    CO2 28 04/19/2018 07:22 PM    Anion gap 7 04/19/2018 07:22 PM    Glucose 97 04/19/2018 07:22 PM    BUN 11 04/19/2018 07:22 PM    Creatinine 1.12 04/19/2018 07:22 PM    BUN/Creatinine ratio 10 (L) 04/19/2018 07:22 PM    GFR est AA >60 04/19/2018 07:22 PM    GFR est non-AA >60 04/19/2018 07:22 PM    Calcium 9.1 04/19/2018 07:22 PM    Bilirubin, total 0.5 04/19/2018 07:22 PM    AST (SGOT) 20 04/19/2018 07:22 PM    Alk. phosphatase 75 04/19/2018 07:22 PM    Protein, total 8.0 04/19/2018 07:22 PM    Albumin 4.3 04/19/2018 07:22 PM    Globulin 3.7 04/19/2018 07:22 PM    A-G Ratio 1.2 04/19/2018 07:22 PM    ALT (SGPT) 36 04/19/2018 07:22 PM          Assessment:       ICD-10-CM ICD-9-CM    1. Chest pain with high risk of acute coronary syndrome R07.9 786.50 AMB POC EKG ROUTINE W/ 12 LEADS, INTER & REP   2. Dyslipidemia (high LDL; low HDL) E78.5 272.4 AMB POC EKG ROUTINE W/ 12 LEADS, INTER & REP   3. Hypertension, essential, benign I10 401.1 AMB POC EKG ROUTINE W/ 12 LEADS, INTER & REP   4. Rapid palpitations R00.2 785.1 AMB POC EKG ROUTINE W/ 12 LEADS, INTER & REP   5. Chronic midline back pain, unspecified back location M54.9 724.5 AMB POC EKG ROUTINE W/ 12 LEADS, INTER & REP    G89.29 338.29    6. Primary osteoarthritis involving multiple joints M15.0 715.09 AMB POC EKG ROUTINE W/ 12 LEADS, INTER & REP   7. Adjustment disorder with anxiety F43.22 309.24 AMB POC EKG ROUTINE W/ 12 LEADS, INTER & REP         Discussion: Patient presents at this time with persistent intermittent/steady substernal chest tightness with throat and lower jaw radiation worse on exertion since evaluation [facility]. Very anxious. Has hx of marked generalized anxiety and it seems panic attacks. Took himself off Lorazepam 2 weeks ago or so. Stat EKG reveals no acute changes.  However, givn presentation and hx believe we should transfer to ED ED [facility] for consideration semi-emergent cardiac cath. Will summons EMS for transfer. Chest somewhat better after sl NTG. If cardiac cath unremarkable then will need GI and Psychiatric w/u and Rx. Plan: 1. Continue same meds. Lipid profile and labs followed by PCP. 2.Encouraged to exercise to tolerance and follow low fat, low cholesterol, low sodium predominantly Plant-based (consider Mediterranean) diet. Call with questions or concerns. Will follow up any test results by phone and/or f/u here in office if needed. Jose Luis Rodríguez 3.Follow up: 2-3 WEEKS    I have discussed the diagnosis with the patient and the intended plan as seen in the above orders. The patient has received an after-visit summary and questions were answered concerning future plans. I have discussed any concerning medication side effects and warnings with the patient as well.     Marjan Sterling MD  4/20/2018

## 2018-04-20 NOTE — ED NOTES
Verbal shift change report given to KRISTINE Martinez (oncoming nurse) by Nicolasa Mendez RN (offgoing nurse). Report included the following information SBAR, Kardex, ED Summary, [facility] and Recent Results.

## 2018-04-20 NOTE — IP AVS SNAPSHOT
[address] [facility] 
[phone] Patient: James Field MRN: [id number] :1965 About your hospitalization You were admitted on:  2018 You [facility]  [facility] You were discharged on:  2018 Why you were hospitalized Your primary diagnosis was:  Not on File Your diagnoses also included:  Unstable Angina (Hcc), Dyslipidemia (High Ldl; Low Hdl), Hypertension, Essential, Benign, Family History Of Ischemic Heart Disease, Cad (Coronary Artery Disease), Native Coronary Artery, Myocardial Bridge Follow-up Information Follow up With Details Comments Contact Info Hiren Urena MD   [address] [facility] [address] [facility] 
[phone] Arleth Ward MD Schedule an appointment as soon as possible for a visit in 2 weeks  [address] [facility] 
[phone] Discharge Orders None A check piper indicates which time of day the medication should be taken. My Medications START taking these medications Instructions Each Dose to Equal  
 Morning Noon Evening Bedtime  
 atorvastatin 20 mg tablet Commonly known as:  LIPITOR Your last dose was: Your next dose is: Take 1 Tab by mouth daily. Lifelong medicine for cholesterol 20 mg  
    
   
   
   
  
 naloxone 2 mg/actuation Spry Your last dose was: Your next dose is:    
   
   
 Use 1 spray intranasally into 1 nostril. Use a new Narcan nasal spray for subsequent doses and administer into alternating nostrils. May repeat every 2 to 3 minutes as needed. raNITIdine 150 mg tablet Commonly known as:  ZANTAC Your last dose was: Your next dose is: Take 1 Tab by mouth two (2) times a day.  For suspected heartburn- refills per PCP  
 150 mg  
 CONTINUE taking these medications Instructions Each Dose to Equal  
 Morning Noon Evening Bedtime  
 aspirin 81 mg chewable tablet Your last dose was: Your next dose is: Take 1 Tab by mouth daily for 14 days. Indications: myocardial infarction prevention 81 mg  
    
   
   
   
  
 busPIRone 10 mg tablet Commonly known as:  BUSPAR Your last dose was: Your next dose is: Take 10 mg by mouth three (3) times daily. 10 mg LORazepam 1 mg tablet Commonly known as:  ATIVAN Your last dose was: Your next dose is: Take 1 mg by mouth every four (4) hours as needed for Anxiety. 1 mg  
    
   
   
   
  
 methadone 10 mg tablet Commonly known as:  DOLOPHINE Your last dose was: Your next dose is: Take 10 mg by mouth every six (6) hours as needed for Pain. 1 AM 2 Noon 1 QHS 10 mg  
    
   
   
   
  
 metoprolol tartrate 25 mg tablet Commonly known as:  LOPRESSOR Your last dose was: Your next dose is: Take 1 Tab by mouth two (2) times a day. 25 mg  
    
   
   
   
  
 nitroglycerin 0.4 mg SL tablet Commonly known as:  NITROSTAT Your last dose was: Your next dose is: Take 1 Tab by mouth every five (5) minutes as needed for Chest Pain. Sit/Lay down then put one tab under the tongue every 5 minutes as needed for chest pain for 3 doses 0.4 mg Where to Get Your Medications These medications were sent to [address] [address], [address] [place] [address] Phone:  [phone]  
  atorvastatin 20 mg tablet  
 metoprolol tartrate 25 mg tablet  
 raNITIdine 150 mg tablet Information on where to get these meds will be given to you by the nurse or doctor. ! Ask your nurse or doctor about these medications  
  naloxone 2 mg/actuation Spry Opioid Education Prescription Opioids: What You Need to Know: 
 
 
Discharge Physician: Ngozi Ashby MD 
 
Admission Diagnoses:  
Unstable angina [facility]) Discharge Diagnoses: Active Problems: 
  Dyslipidemia (high LDL; low HDL) (6/27/2014) Hypertension, essential, benign (6/27/2014) Unstable angina ([address] (4/20/2018) Family history of ischemic heart disease (4/20/2018) CAD (coronary artery disease), native coronary artery (4/20/2018) Overview: Mild, nonobstructive by cardiac catheterization 4/20/2018 Myocardial bridge (4/20/2018) Overview: 0% narrowing in  diastole, 60% narrowing in systole on cardiac cath 4/20/2018 Discharge Condition: Good Cardiology Procedures this Admission:  Diagnostic left heart catheterization Disposition: home Reference discharge instructions provided by nursing for diet and activity. Signed: 
Ngozi Ashby MD 
4/20/2018 5:18 PM 
 
 
Radial Cardiac Catheterization/Angiography Discharge Instructions It is normal to feel tired the first couple days. Take it easy and follow the physicians instructions. CHECK THE CATHETER INSERTION SITE DAILY: 
 
Remove the wrist dressing 24 hours after the procedure. You may shower 24 hours after the procedure. Wash with soap and water and pat dry.  
Gentle cleaning of the site with soap and water is sufficient, cover with a dry clean dressing or bandage. Do not apply creams or powders to the area. No soaking the wrist for 3 days. Leave the puncture site open to air after 24 hours post-procedure. CALL THE PHYSICIANS:  
 
If the site becomes red, swollen or feels warm to the touch If there is bleeding or drainage or if there is unusual pain at the radial site. If there is any minor oozing, you may apply a band-aid and remove after 12 hours. If the bleeding continues, hold pressure with the middle finger against the puncture site and the thumb against the back of the wrist,call 911 to be transported to the hospital. 
DO NOT DRIVE YOURSELF, [employer]. ACTIVITY:  
For the first 24 hours do not manipulate the wrist. 
No lifting, pushing or pulling over 3-5 pounds with the affected wrist for 7 daysand no straining the insertion site. Do not life grocery bags or the garbage can, do not run the vacuum  or  for 7 days. Start with short walks as in the hospital and gradually increase as tolerated each day. It is recommended to walk 30 minutes 5-7 days per week. Follow your physicians instructions on activity. Avoid walking outside in extremes of heat or cold. Walk inside when it is cold and windy or hot and humid. Things to keep in mind: 
No driving for at least 24 hours, or as designated by your physician. Limit the number of times you go up and down the stairs Take rests and pace yourself with activity. Be careful and do not strain with bowel movements. MEDICATIONS: 
 
Take all medications as prescribed Call your physician if you have any questions Keep an updated list of your medications with you at all times and give a list to your physician and pharmacist 
 
SIGNS AND SYMPTOMS:  
Be cautious of symptoms of angina or recurrent symptoms such as chest discomfort, unusual shortness of breath or fatigue.   These could be symptoms of restenosis, a new blockage or a heart attack. If your symptoms are relieved with rest it is still recommended that you notify your physician of recurrent chest pain or discomfort. For CHEST PAIN or symptoms of angina not relieved with rest:  If the discomfort is not relieved with rest, and you have been prescribed Nitroglycerin, take as directed (taken under the tongue, one at a time 5 minutes apart for a total of 3 doses). If the discomfort is not relieved after the 3rd nitroglycerin, call 911. If you have not been prescribed Nitroglycerin  and your chest discomfort is not relieved with rest, call 911. AFTER CARE:  
Follow up with your physician as instructed. Follow a heart healthy diet with proper portion control, daily stress management, daily exercise, blood pressure and cholesterol control , and smoking cessation. Introducing [facility] SERVICES! Marie Mendiola introduces [employer] patient portal. Now you can access parts of your medical record, email your doctor's office, and request medication refills online. 1. In your internet browser, go to [email or web address] 2. Click on the First Time User? Click Here link in the Sign In box. You will see the New Member Sign Up page. 3. Enter your [employer]erks Access Code exactly as it appears below. You will not need to use this code after youve completed the sign-up process. If you do not sign up before the expiration date, you must request a new code. · [employer]ks Access Code: [email or web address] Expires: 7/18/2018  7:12 PM 
 
4. Enter the last four digits of your Social Security Number (xxxx) and Date of Birth (mm/dd/yyyy) as indicated and click Submit. You will be taken to the next sign-up page. 5. Create a VIPerks ID. This will be your [employer]erks login ID and cannot be changed, so think of one that is secure and easy to remember. 6. Create a [employer]erks password. You can change your password at any time. 7. Enter your Password Reset Question and Answer. This can be used at a later time if you forget your password. 8. Enter your e-mail address. You will receive e-mail notification when new information is available in [address] 9. Click Sign Up. You can now view and download portions of your medical record. 10. Click the Download Summary menu link to download a portable copy of your medical information. If you have questions, please visit the Frequently Asked Questions section of the [employer] website. Remember, [employer] is NOT to be used for urgent needs. For medical emergencies, dial 911. Now available from your iPhone and Android! Introducing Xander Licea As a [employer] patient, I wanted to make you aware of our electronic visit tool called Xander Licea. [employer] 24/7 allows you to connect within minutes with a medical provider 24 hours a day, seven days a week via a mobile device or tablet or logging into a secure website from your computer. You can access Xander Licea from anywhere in the [place]. A virtual visit might be right for you when you have a simple condition and feel like you just dont want to get out of bed, or cant get away from work for an appointment, when your regular [employer] provider is not available (evenings, weekends or holidays), or when youre out of town and need minor care. Electronic visits cost only $49 and if the [employer] 24/7 provider determines a prescription is needed to treat your condition, one can be electronically transmitted to a nearby pharmacy*. Please take a moment to enroll today if you have not already done so. The enrollment process is free and takes just a few minutes. To enroll, please download the [employer] 24/7 cee to your tablet or phone, or visit [email or web address] to enroll on your computer.    
And, as an [address] [place] [address] patient with a [employer] account, the results of your visits will be scanned into your electronic medical record and your primary care provider will be able to view the scanned results. We urge you to continue to see your regular [employer] provider for your ongoing medical care. And while your primary care provider may not be the one available when you seek a Xander Miguelfin virtual visit, the peace of mind you get from getting a real diagnosis real time can be priceless. For more information on [employer], view our Frequently Asked Questions (FAQs) at [email or web address]. Sincerely, 
 
Katty Barrera MD 
Chief Medical Officer [address] *:  certain medications cannot be prescribed via [employer] Providers Seen During Your Hospitalization Provider Specialty Primary office phone Fiona Black MD Emergency Medicine [phone] Sergio Liriano MD Cardiology [phone] Your Primary Care Physician (PCP) Primary Care Physician Office Phone Office Fax [address] [phone] You are allergic to the following No active allergies Recent Documentation Height Weight BMI Smoking Status 1.778 m 89.8 kg 28.41 kg/m2 Never Smoker Emergency Contacts Name Discharge Info Relation Home Work Mobile Thien Cabral CAREGIVER [3] Spouse [3] [phone] Patient Belongings The following personal items are in your possession at time of discharge: 
  Dental Appliances: None  Visual Aid: None      Home Medications: None   Jewelry: Necklace  Clothing: None    Other Valuables: None Please provide this summary of care documentation to your next provider. Signatures-by signing, you are acknowledging that this After Visit Summary has been reviewed with you and you have received a copy.   
  
 
  
    
    
 Patient Signature: ____________________________________________________________ Date:  ____________________________________________________________  
  
Choe Livings Provider Signature:  ____________________________________________________________ Date:  ____________________________________________________________

## 2018-04-20 NOTE — ED NOTES
Ddimer sent to lab. Pt medicated per MAR. Lucas provided to pt for comfort. Needs met at this time. Call bell within reach.

## 2018-04-20 NOTE — TELEPHONE ENCOUNTER
----- Message from Carmen Velasquez RN sent at 4/20/2018  8:55 AM EDT -----  Regarding: FW: f/u appointment  Please schedule patient for f/u with Dr. Valerie Rossi for as soon as possible. Thanks! Best Fair  ----- Message -----     From: Dario Cooper NP     Sent: 4/20/2018   8:09 AM       To: Carmen Velasquez RN  Subject: f/u appointment                                  Best Fair, Mr. Wade Hernandez was seen in ED for CP, discharged with instructions to f/u. Can you reach out to pt and assist with f/u appt?   Thanks

## 2018-04-20 NOTE — IP AVS SNAPSHOT
[address] [facility] 
[phone] Patient: Machelle Cagle MRN: [id number] :1965 A check piper indicates which time of day the medication should be taken. My Medications START taking these medications Instructions Each Dose to Equal  
 Morning Noon Evening Bedtime  
 atorvastatin 20 mg tablet Commonly known as:  LIPITOR Your last dose was: Your next dose is: Take 1 Tab by mouth daily. Lifelong medicine for cholesterol 20 mg  
    
   
   
   
  
 naloxone 2 mg/actuation Spry Your last dose was: Your next dose is:    
   
   
 Use 1 spray intranasally into 1 nostril. Use a new Narcan nasal spray for subsequent doses and administer into alternating nostrils. May repeat every 2 to 3 minutes as needed. raNITIdine 150 mg tablet Commonly known as:  ZANTAC Your last dose was: Your next dose is: Take 1 Tab by mouth two (2) times a day. For suspected heartburn- refills per PCP  
 150 mg CONTINUE taking these medications Instructions Each Dose to Equal  
 Morning Noon Evening Bedtime  
 aspirin 81 mg chewable tablet Your last dose was: Your next dose is: Take 1 Tab by mouth daily for 14 days. Indications: myocardial infarction prevention 81 mg  
    
   
   
   
  
 busPIRone 10 mg tablet Commonly known as:  BUSPAR Your last dose was: Your next dose is: Take 10 mg by mouth three (3) times daily. 10 mg LORazepam 1 mg tablet Commonly known as:  ATIVAN Your last dose was: Your next dose is: Take 1 mg by mouth every four (4) hours as needed for Anxiety. 1 mg  
    
   
   
   
  
 methadone 10 mg tablet Commonly known as:  DOLOPHINE Your last dose was: Your next dose is: Take 10 mg by mouth every six (6) hours as needed for Pain. 1 AM 2 Noon 1 QHS 10 mg  
    
   
   
   
  
 metoprolol tartrate 25 mg tablet Commonly known as:  LOPRESSOR Your last dose was: Your next dose is: Take 1 Tab by mouth two (2) times a day. 25 mg  
    
   
   
   
  
 nitroglycerin 0.4 mg SL tablet Commonly known as:  NITROSTAT Your last dose was: Your next dose is: Take 1 Tab by mouth every five (5) minutes as needed for Chest Pain. Sit/Lay down then put one tab under the tongue every 5 minutes as needed for chest pain for 3 doses 0.4 mg Where to Get Your Medications These medications were sent to [address], [address] [place] [address] Phone:  [phone]  
  atorvastatin 20 mg tablet  
 metoprolol tartrate 25 mg tablet  
 raNITIdine 150 mg tablet Information on where to get these meds will be given to you by the nurse or doctor. ! Ask your nurse or doctor about these medications  
  naloxone 2 mg/actuation Spry

## 2018-04-20 NOTE — ED NOTES
Assumed care of pt. Pt c/o chest pain 3/10. Headache 3/10; c/o minor sensitivity to light. Pt states pain/discomfort began 4 hours ago and onset was immediate. Pt states he has hx of panic attacks. Pt takes methadone for Timbo's Disease. Pt lungs clear on auscultation. Denies SOB. Pt placed on monitor x 3. Call bell within reach.

## 2018-04-20 NOTE — ED NOTES
Specimens sent to lab at this time. POC Troponin bedside. Dr Morales Vlad speaking with pt and wife at this time.

## 2018-04-20 NOTE — TELEPHONE ENCOUNTER
Spoke with patient. Verified patient with two patient identifiers. Scheduled appt for this afternoon at [facility], arrive at 12:45 pm.  Patient verbalized understanding.

## 2018-04-20 NOTE — ED PROVIDER NOTES
EMERGENCY DEPARTMENT HISTORY AND PHYSICAL EXAM      Date: 4/20/2018  Patient Name: Joanne Ya    History of Presenting Illness     Chief Complaint   Patient presents with    Chest Pain     arrived EMS for Chest pain that worsened today while at MD. Pt d/c last night with Script for nitro unable to fill. Recieved 1 round of nitro. MD spoke with cardiology pt will be cathed today. History Provided By: Patient    HPI: Joanne Ya, 46 y.o. male with PMHx significant for arthritis, anxiety, presents via EMS to the ED with cc of severe, worsening chest pain, along with associated nausea, and throat tightness x earlier today. Pt was seen last night at [facility] ED where he was discharged with NTG and a follow up with Dr. Simba Kelly, Cardiology for this morning. He notes going to see Dr. Simba Kelly this morning, and having his symptoms onset again; Dr. Simba Kelly gave him Zofran and NTG, and had him come to the ED for a heart catheterization procedure. Of note, pt is on methadone for chronic back pain, and further reports having a normal stress test ~2yrs ago. He denies any Hx of CAD, and hyperlipidemia. Social hx:  ETOH: no  Tobacco: no  Illicit drug use: no     PCP: Yg Tobias MD    There are no other complaints, changes, or physical findings at this time.     Current Facility-Administered Medications   Medication Dose Route Frequency Provider Last Rate Last Dose    fentaNYL citrate (PF) injection 25-50 mcg  25-50 mcg IntraVENous Multiple Radha Morales MD   25 mcg at 04/20/18 1600    iopamidol (ISOVUE-370) 76 % injection 0-150 mL  0-150 mL IntraVENous Multiple Radha Morales MD        iopamidol (ISOVUE-370) 76 % injection 0-50 mL  0-50 mL IntraVENous Multiple Radha Morales MD        lidocaine (PF) (XYLOCAINE) 10 mg/mL (1 %) injection 1-30 mL  1-30 mL IntraDERMal Multiple Radha Morales MD        midazolam (VERSED) injection 0.5-2 mg  0.5-2 mg IntraVENous Multiple Kevine Kori Patrick Rome MD   2 mg at 04/20/18 1559    nitroglycerin 100 mcg/ml compounded injection  0.1-0.3 Vial IntraarTERial Multiple Jim Vásquez MD        heparin (porcine) 1,000 unit/mL injection 1,000-10,000 Units  1,000-10,000 Units IntraVENous Multiple Jim Vásquez MD        verapamil (ISOPTIN) 2.5 mg/mL injection 2.5 mg  2.5 mg IntraarTERial ONCE Jim Vásquez MD        verapamil (ISOPTIN) 2.5 mg/mL injection             lidocaine (PF) (XYLOCAINE) 10 mg/mL (1 %) injection             heparin (porcine) 1,000 unit/mL injection             iopamidol (ISOVUE-370) 76 % injection             iopamidol (ISOVUE-370) 76 % injection             nitroglycerin 1 mg/10mL injection             metoprolol tartrate (LOPRESSOR) tablet 12.5 mg  12.5 mg Oral Q12H [place] Petties, NP           Past History     Past Medical History:  Past Medical History:   Diagnosis Date    Arthritis     Endocrine disease     Hypothyroid    Hypertension     Ill-defined condition     Timbo's Disease    Ill-defined condition     Anxiety       Past Surgical History:  Past Surgical History:   Procedure Laterality Date    HX ORTHOPAEDIC      Knee    HX ORTHOPAEDIC      Hip       Family History:  No family history on file. Social History:  Social History   Substance Use Topics    Smoking status: Never Smoker    Smokeless tobacco: Never Used    Alcohol use No       Allergies:  No Known Allergies      Review of Systems   Review of Systems   Constitutional: Negative. Negative for chills and fever. HENT: Positive for congestion. Negative for rhinorrhea and sinus pressure. Eyes: Negative. Negative for discharge and redness. Respiratory: Negative. Negative for chest tightness and shortness of breath. Cardiovascular: Positive for chest pain. Gastrointestinal: Positive for nausea. Negative for abdominal pain and blood in stool. Endocrine: Negative. Genitourinary: Negative.   Negative for flank pain and hematuria. Musculoskeletal: Negative. Negative for back pain. Skin: Negative. Negative for rash. Neurological: Negative. Negative for dizziness, seizures, weakness, numbness and headaches. Hematological: Negative. All other systems reviewed and are negative. Physical Exam   Physical Exam   Constitutional: He is oriented to person, place, and time. He appears well-developed and well-nourished. No distress. HENT:   Head: Normocephalic and atraumatic. Nose: Nose normal.   Mouth/Throat: No oropharyngeal exudate. Eyes: Conjunctivae and EOM are normal. Pupils are equal, round, and reactive to light. No scleral icterus. Neck: Normal range of motion. Neck supple. No JVD present. No thyromegaly present. Cardiovascular: Normal rate, regular rhythm, normal heart sounds, intact distal pulses and normal pulses. PMI is not displaced. Exam reveals no gallop and no friction rub. No murmur heard. Pulmonary/Chest: Effort normal and breath sounds normal. No stridor. No respiratory distress. He has no decreased breath sounds. He has no wheezes. He has no rhonchi. He has no rales. He exhibits no tenderness. Abdominal: Soft. Normal aorta and bowel sounds are normal. He exhibits no distension, no abdominal bruit and no mass. There is no hepatosplenomegaly. There is no tenderness. There is no rebound, no guarding and no CVA tenderness. No hernia. Neurological: He is alert and oriented to person, place, and time. He has normal reflexes. He displays no atrophy and no tremor. No cranial nerve deficit or sensory deficit. He exhibits normal muscle tone. He displays no seizure activity. Coordination normal. GCS eye subscore is 4. GCS verbal subscore is 5. GCS motor subscore is 6. Reflex Scores:       Patellar reflexes are 2+ on the right side and 2+ on the left side. Skin: Skin is warm. No rash noted. He is not diaphoretic. No erythema. Nursing note and vitals reviewed.         Diagnostic Study Results     Labs -     Recent Results (from the past 12 hour(s))   EKG, 12 LEAD, INITIAL    Collection Time: 04/20/18  2:17 PM   Result Value Ref Range    Ventricular Rate 78 BPM    Atrial Rate 78 BPM    P-R Interval 142 ms    QRS Duration 92 ms    Q-T Interval 406 ms    QTC Calculation (Bezet) 462 ms    Calculated P Axis 27 degrees    Calculated R Axis 24 degrees    Calculated T Axis 22 degrees    Diagnosis       Normal sinus rhythm  Normal ECG  When compared with ECG of 19-APR-2018 19:16,  No significant change was found     CBC WITH AUTOMATED DIFF    Collection Time: 04/20/18  2:24 PM   Result Value Ref Range    WBC 9.5 4.1 - 11.1 K/uL    RBC 5.25 4. 10 - 5.70 M/uL    HGB 15.3 12.1 - 17.0 g/dL    HCT 45.0 36.6 - 50.3 %    MCV 85.7 80.0 - 99.0 FL    MCH 29.1 26.0 - 34.0 PG    MCHC 34.0 30.0 - 36.5 g/dL    RDW 12.4 11.5 - 14.5 %    PLATELET 701 384 - 717 K/uL    MPV 9.5 8.9 - 12.9 FL    NRBC 0.0 0  WBC    ABSOLUTE NRBC 0.00 0.00 - 0.01 K/uL    NEUTROPHILS 80 (H) 32 - 75 %    LYMPHOCYTES 13 12 - 49 %    MONOCYTES 6 5 - 13 %    EOSINOPHILS 0 0 - 7 %    BASOPHILS 0 0 - 1 %    IMMATURE GRANULOCYTES 0 0.0 - 0.5 %    ABS. NEUTROPHILS 7.6 1.8 - 8.0 K/UL    ABS. LYMPHOCYTES 1.2 0.8 - 3.5 K/UL    ABS. MONOCYTES 0.6 0.0 - 1.0 K/UL    ABS. EOSINOPHILS 0.0 0.0 - 0.4 K/UL    ABS. BASOPHILS 0.0 0.0 - 0.1 K/UL    ABS. IMM.  GRANS. 0.0 0.00 - 0.04 K/UL    DF AUTOMATED     METABOLIC PANEL, COMPREHENSIVE    Collection Time: 04/20/18  2:24 PM   Result Value Ref Range    Sodium 137 136 - 145 mmol/L    Potassium 4.2 3.5 - 5.1 mmol/L    Chloride 102 97 - 108 mmol/L    CO2 29 21 - 32 mmol/L    Anion gap 6 5 - 15 mmol/L    Glucose 105 (H) 65 - 100 mg/dL    BUN 13 6 - 20 MG/DL    Creatinine 1.19 0.70 - 1.30 MG/DL    BUN/Creatinine ratio 11 (L) 12 - 20      GFR est AA >60 >60 ml/min/1.73m2    GFR est non-AA >60 >60 ml/min/1.73m2    Calcium 9.4 8.5 - 10.1 MG/DL    Bilirubin, total 0.6 0.2 - 1.0 MG/DL    ALT (SGPT) 34 12 - 78 U/L    AST (SGOT) 16 15 - 37 U/L    Alk. phosphatase 80 45 - 117 U/L    Protein, total 7.9 6.4 - 8.2 g/dL    Albumin 4.1 3.5 - 5.0 g/dL    Globulin 3.8 2.0 - 4.0 g/dL    A-G Ratio 1.1 1.1 - 2.2     CK W/ REFLX CKMB    Collection Time: 04/20/18  2:24 PM   Result Value Ref Range     39 - 308 U/L   TROPONIN I    Collection Time: 04/20/18  2:24 PM   Result Value Ref Range    Troponin-I, Qt. <0.04 <0.05 ng/mL       Radiologic Studies -   CXR Results  (Last 48 hours)               04/20/18 1457  XR CHEST PORT Final result    Impression:  IMPRESSION: No acute cardiopulmonary process seen. Possible left lung granuloma,   for which comparison to any prior outside films would be recommended to document   stability. Narrative:  EXAM:  XR CHEST PORT       INDICATION:  chest pain       COMPARISON:  4/19/2018       FINDINGS: A portable AP radiograph of the chest was obtained at 1456 hours. The   patient is on a cardiac monitor. There is a persistent 9 mm nodular focus   projected inferolateral to the left hilum. The cardiac and mediastinal contours   and pulmonary vascularity are normal.  The bones and soft tissues are grossly   within normal limits. 04/19/18 2324  XR CHEST PA LAT Final result    Impression:  IMPRESSION: No acute abnormality identified. There is a small nodule in the left   midlung may represent a granuloma. Comparison with prior studies would be   helpful. Narrative:  EXAM:  XR CHEST PA LAT       INDICATION:   CHEST PAIN       COMPARISON: None. FINDINGS: PA and lateral radiographs of the chest demonstrate lungs clear of an   acute process. There is a small nodule in the left mid lung. Lavada Saucer Heart size is   upper limits of normal.  Bony structures are intact. Medical Decision Making   I am the first provider for this patient.     I reviewed the vital signs, available nursing notes, past medical history, past surgical history, family history and social history. Vital Signs-Reviewed the patient's vital signs. Patient Vitals for the past 12 hrs:   Temp Pulse Resp BP SpO2   04/20/18 1417 98 °F (36.7 °C) 75 22 151/85 98 %     EKG interpretation: (Preliminary) 1417  Rhythm: normal sinus rhythm; and regular . Rate (approx.): 78 bpm; Axis: normal; VT interval: normal; QRS interval: normal ; ST/T wave: normal; Other findings: normal.    Records Reviewed: Old Medical Records    Provider Notes (Medical Decision Making):     DDx: ACS, aortic dissection, PE, biliary colic, pancreatitis, hepatitis. Plan/impression: sent here by cardiology with concerns for unstable angina. Cardio will admit for cardiac cath. ED Course:   Initial assessment performed. The patients presenting problems have been discussed, and they are in agreement with the care plan formulated and outlined with them. I have encouraged them to ask questions as they arise throughout their visit. Consult Note:  3:15 PM  Cait Mandujano MD spoke with Gordon Donnelly NP  Specialty: Cardiology  Discussed pt's hx, disposition, and available diagnostic and imaging results. Reviewed care plans. Consultant agrees with plans as outlined. Gordon Donnelly NP will evaluate the pt with plan for admission. 4:07 PM  Dr. Bertha Frausto decided not have a cta chest done. He will just take the pt to the cardiac lab. Written by Marah Del Cid ED scribe, as dictated by Cait Mandujano MD    Disposition:  Admit Note:  4:08 PM  Pt is being admitted by Dr. Kt Mccoy. The results of their tests and reason(s) for their admission have been discussed with pt and/or available family. They convey agreement and understanding for the need to be admitted and for admission diagnosis. Diagnosis     Clinical Impression:   1. Unstable angina ([address]        Attestations:    This note is prepared by Marah Del Cid, acting as Scribe for Cait Mandujano MD.      The scribe's documentation has been prepared under my direction and personally reviewed by me in its entirety. I confirm that the note above accurately reflects all work, treatment, procedures, and medical decision making performed by me.   Catie Castañeda MD

## 2018-04-20 NOTE — DISCHARGE INSTRUCTIONS
[address] [address]  [phone]        Patient ID:  Rachel Newman  [id number]  20 y.o.  1965    Admit Date: 4/20/2018    Discharge Date: 4/20/2018     Admitting Physician: Nancy Birmingham MD     Discharge Physician: Nancy Birmingham MD    Admission Diagnoses:   Unstable angina [facility])    Discharge Diagnoses: Active Problems:    Dyslipidemia (high LDL; low HDL) (6/27/2014)      Hypertension, essential, benign (6/27/2014)      Unstable angina ([address] (4/20/2018)      Family history of ischemic heart disease (4/20/2018)      CAD (coronary artery disease), native coronary artery (4/20/2018)      Overview: Mild, nonobstructive by cardiac catheterization 4/20/2018      Myocardial bridge (4/20/2018)      Overview: 0% narrowing in  diastole, 60% narrowing in systole on cardiac cath       4/20/2018        Discharge Condition: Good    Cardiology Procedures this Admission:  Diagnostic left heart catheterization    Disposition: home    Reference discharge instructions provided by nursing for diet and activity. Signed:  Nancy Birmingham MD  4/20/2018  5:18 PM      Radial Cardiac Catheterization/Angiography Discharge Instructions    It is normal to feel tired the first couple days. Take it easy and follow the physicians instructions. CHECK THE CATHETER INSERTION SITE DAILY:    Remove the wrist dressing 24 hours after the procedure. You may shower 24 hours after the procedure. Wash with soap and water and pat dry. Gentle cleaning of the site with soap and water is sufficient, cover with a dry clean dressing or bandage. Do not apply creams or powders to the area. No soaking the wrist for 3 days. Leave the puncture site open to air after 24 hours post-procedure. CALL THE PHYSICIANS:     If the site becomes red, swollen or feels warm to the touch  If there is bleeding or drainage or if there is unusual pain at the radial site.      If there is any minor oozing, you may apply a band-aid and remove after 12 hours. If the bleeding continues, hold pressure with the middle finger against the puncture site and the thumb against the back of the wrist,call 911 to be transported to the hospital.  DO NOT DRIVE YOURSELF, [place]7. ACTIVITY:   For the first 24 hours do not manipulate the wrist.  No lifting, pushing or pulling over 3-5 pounds with the affected wrist for 7 daysand no straining the insertion site. Do not life grocery bags or the garbage can, do not run the vacuum  or  for 7 days. Start with short walks as in the hospital and gradually increase as tolerated each day. It is recommended to walk 30 minutes 5-7 days per week. Follow your physicians instructions on activity. Avoid walking outside in extremes of heat or cold. Walk inside when it is cold and windy or hot and humid. Things to keep in mind:  No driving for at least 24 hours, or as designated by your physician. Limit the number of times you go up and down the stairs  Take rests and pace yourself with activity. Be careful and do not strain with bowel movements. MEDICATIONS:    Take all medications as prescribed  Call your physician if you have any questions  Keep an updated list of your medications with you at all times and give a list to your physician and pharmacist    SIGNS AND SYMPTOMS:   Be cautious of symptoms of angina or recurrent symptoms such as chest discomfort, unusual shortness of breath or fatigue. These could be symptoms of restenosis, a new blockage or a heart attack. If your symptoms are relieved with rest it is still recommended that you notify your physician of recurrent chest pain or discomfort.   For CHEST PAIN or symptoms of angina not relieved with rest:  If the discomfort is not relieved with rest, and you have been prescribed Nitroglycerin, take as directed (taken under the tongue, one at a time 5 minutes apart for a total of 3 doses). If the discomfort is not relieved after the 3rd nitroglycerin, call 911. If you have not been prescribed Nitroglycerin  and your chest discomfort is not relieved with rest, call 911. AFTER CARE:   Follow up with your physician as instructed. Follow a heart healthy diet with proper portion control, daily stress management, daily exercise, blood pressure and cholesterol control , and smoking cessation.

## 2018-04-21 NOTE — PROGRESS NOTES
Discharge instructions reviewed with patient. Time given to ask questions or express concerns. Patient's R radial site is dry and intact with no signs of hematoma. Removed peripheral line and telemetry. V/S stable. Patient collected all belongings. 2030-Patient ambulated in hallway and has voided.

## 2018-04-23 ENCOUNTER — TELEPHONE (OUTPATIENT)
Dept: CARDIOLOGY CLINIC | Age: 53
End: 2018-04-23

## 2018-04-23 DIAGNOSIS — E78.5 DYSLIPIDEMIA (HIGH LDL; LOW HDL): Primary | ICD-10-CM

## 2018-04-23 NOTE — TELEPHONE ENCOUNTER
Spoke with patient. Verified patient with two patient identifiers. Discussed all with pt. Repeat fasting labs in 3 months. Pt feeling much better. Patient verbalized understanding.

## 2018-04-23 NOTE — TELEPHONE ENCOUNTER
LMVM to call us. Per Dr. Kristyn Coleman, had 60% LAD stenosis, no stent needed. Started BB, statin, Zantac, ASA. Get fasting labs 3 months. Mailed lab slip to pt.

## 2018-05-02 NOTE — ED NOTES
Spoke with patient regarding incidental left lung granuloma noted on cxr on prior ED visit. Advised f/u with pcp to monitor possible granuloma for surveillance. May return to ED w/ any new complaints or concerns.

## 2018-11-13 ENCOUNTER — HOSPITAL ENCOUNTER (INPATIENT)
Age: 53
LOS: 3 days | Discharge: HOME | DRG: 881 | End: 2018-11-16
Attending: EMERGENCY MEDICINE | Admitting: PSYCHIATRY & NEUROLOGY
Payer: COMMERCIAL

## 2018-11-13 DIAGNOSIS — F32.A DEPRESSION, UNSPECIFIED DEPRESSION TYPE: Primary | ICD-10-CM

## 2018-11-13 LAB
ALBUMIN SERPL-MCNC: 3.9 G/DL (ref 3.5–5)
ALBUMIN/GLOB SERPL: 1 {RATIO} (ref 1.1–2.2)
ALP SERPL-CCNC: 78 U/L (ref 45–117)
ALT SERPL-CCNC: 39 U/L (ref 12–78)
AMPHET UR QL SCN: NEGATIVE
ANION GAP SERPL CALC-SCNC: 8 MMOL/L (ref 5–15)
APAP SERPL-MCNC: <2 UG/ML (ref 10–30)
AST SERPL-CCNC: 20 U/L (ref 15–37)
BARBITURATES UR QL SCN: NEGATIVE
BASOPHILS # BLD: 0 K/UL (ref 0–0.1)
BASOPHILS NFR BLD: 1 % (ref 0–1)
BENZODIAZ UR QL: POSITIVE
BILIRUB SERPL-MCNC: 0.5 MG/DL (ref 0.2–1)
BUN SERPL-MCNC: 13 MG/DL (ref 6–20)
BUN/CREAT SERPL: 11 (ref 12–20)
CALCIUM SERPL-MCNC: 9.1 MG/DL (ref 8.5–10.1)
CANNABINOIDS UR QL SCN: NEGATIVE
CHLORIDE SERPL-SCNC: 107 MMOL/L (ref 97–108)
CO2 SERPL-SCNC: 25 MMOL/L (ref 21–32)
COCAINE UR QL SCN: NEGATIVE
COMMENT, HOLDF: NORMAL
CREAT SERPL-MCNC: 1.14 MG/DL (ref 0.7–1.3)
DIFFERENTIAL METHOD BLD: ABNORMAL
DRUG SCRN COMMENT,DRGCM: ABNORMAL
EOSINOPHIL # BLD: 0 K/UL (ref 0–0.4)
EOSINOPHIL NFR BLD: 0 % (ref 0–7)
ERYTHROCYTE [DISTWIDTH] IN BLOOD BY AUTOMATED COUNT: 13.3 % (ref 11.5–14.5)
ETHANOL SERPL-MCNC: <10 MG/DL
GLOBULIN SER CALC-MCNC: 3.9 G/DL (ref 2–4)
GLUCOSE SERPL-MCNC: 95 MG/DL (ref 65–100)
HCT VFR BLD AUTO: 47.6 % (ref 36.6–50.3)
HGB BLD-MCNC: 15.8 G/DL (ref 12.1–17)
IMM GRANULOCYTES # BLD: 0 K/UL (ref 0–0.04)
IMM GRANULOCYTES NFR BLD AUTO: 1 % (ref 0–0.5)
LYMPHOCYTES # BLD: 1.3 K/UL (ref 0.8–3.5)
LYMPHOCYTES NFR BLD: 16 % (ref 12–49)
MCH RBC QN AUTO: 30.4 PG (ref 26–34)
MCHC RBC AUTO-ENTMCNC: 33.2 G/DL (ref 30–36.5)
MCV RBC AUTO: 91.5 FL (ref 80–99)
METHADONE UR QL: NEGATIVE
MONOCYTES # BLD: 0.7 K/UL (ref 0–1)
MONOCYTES NFR BLD: 8 % (ref 5–13)
NEUTS SEG # BLD: 6.2 K/UL (ref 1.8–8)
NEUTS SEG NFR BLD: 75 % (ref 32–75)
NRBC # BLD: 0 K/UL (ref 0–0.01)
NRBC BLD-RTO: 0 PER 100 WBC
OPIATES UR QL: NEGATIVE
PCP UR QL: NEGATIVE
PLATELET # BLD AUTO: 277 K/UL (ref 150–400)
PMV BLD AUTO: 10 FL (ref 8.9–12.9)
POTASSIUM SERPL-SCNC: 4.1 MMOL/L (ref 3.5–5.1)
PROT SERPL-MCNC: 7.8 G/DL (ref 6.4–8.2)
RBC # BLD AUTO: 5.2 M/UL (ref 4.1–5.7)
SALICYLATES SERPL-MCNC: <1.7 MG/DL (ref 2.8–20)
SAMPLES BEING HELD,HOLD: NORMAL
SODIUM SERPL-SCNC: 140 MMOL/L (ref 136–145)
WBC # BLD AUTO: 8.3 K/UL (ref 4.1–11.1)

## 2018-11-13 PROCEDURE — 93005 ELECTROCARDIOGRAM TRACING: CPT

## 2018-11-13 PROCEDURE — 74011250637 HC RX REV CODE- 250/637

## 2018-11-13 PROCEDURE — 36415 COLL VENOUS BLD VENIPUNCTURE: CPT

## 2018-11-13 PROCEDURE — 80053 COMPREHEN METABOLIC PANEL: CPT

## 2018-11-13 PROCEDURE — 80307 DRUG TEST PRSMV CHEM ANLYZR: CPT

## 2018-11-13 PROCEDURE — 65220000003 HC RM SEMIPRIVATE PSYCH

## 2018-11-13 PROCEDURE — 85025 COMPLETE CBC W/AUTO DIFF WBC: CPT

## 2018-11-13 PROCEDURE — 99285 EMERGENCY DEPT VISIT HI MDM: CPT

## 2018-11-13 PROCEDURE — 90791 PSYCH DIAGNOSTIC EVALUATION: CPT

## 2018-11-13 RX ORDER — ADHESIVE BANDAGE
30 BANDAGE TOPICAL DAILY PRN
Status: DISCONTINUED | OUTPATIENT
Start: 2018-11-13 | End: 2018-11-16 | Stop reason: HOSPADM

## 2018-11-13 RX ORDER — BENZTROPINE MESYLATE 2 MG/1
2 TABLET ORAL
Status: DISCONTINUED | OUTPATIENT
Start: 2018-11-13 | End: 2018-11-16 | Stop reason: HOSPADM

## 2018-11-13 RX ORDER — OLANZAPINE 5 MG/1
5 TABLET ORAL
Status: DISCONTINUED | OUTPATIENT
Start: 2018-11-13 | End: 2018-11-16 | Stop reason: HOSPADM

## 2018-11-13 RX ORDER — BENZTROPINE MESYLATE 1 MG/ML
2 INJECTION INTRAMUSCULAR; INTRAVENOUS
Status: DISCONTINUED | OUTPATIENT
Start: 2018-11-13 | End: 2018-11-16 | Stop reason: HOSPADM

## 2018-11-13 RX ORDER — IBUPROFEN 200 MG
1 TABLET ORAL
Status: DISCONTINUED | OUTPATIENT
Start: 2018-11-13 | End: 2018-11-16 | Stop reason: HOSPADM

## 2018-11-13 RX ORDER — LORAZEPAM 2 MG/ML
2 INJECTION INTRAMUSCULAR
Status: DISCONTINUED | OUTPATIENT
Start: 2018-11-13 | End: 2018-11-16 | Stop reason: HOSPADM

## 2018-11-13 RX ORDER — ZOLPIDEM TARTRATE 10 MG/1
10 TABLET ORAL
Status: DISCONTINUED | OUTPATIENT
Start: 2018-11-13 | End: 2018-11-16 | Stop reason: HOSPADM

## 2018-11-13 RX ORDER — ACETAMINOPHEN 325 MG/1
650 TABLET ORAL
Status: DISCONTINUED | OUTPATIENT
Start: 2018-11-13 | End: 2018-11-16 | Stop reason: HOSPADM

## 2018-11-13 RX ORDER — IBUPROFEN 400 MG/1
400 TABLET ORAL
Status: DISCONTINUED | OUTPATIENT
Start: 2018-11-13 | End: 2018-11-16 | Stop reason: HOSPADM

## 2018-11-13 RX ORDER — LORAZEPAM 1 MG/1
1 TABLET ORAL
Status: DISCONTINUED | OUTPATIENT
Start: 2018-11-13 | End: 2018-11-15

## 2018-11-13 RX ADMIN — ZOLPIDEM TARTRATE 10 MG: 10 TABLET ORAL at 22:11

## 2018-11-13 NOTE — BSMART NOTE
Comprehensive Assessment Form Part 1 Section I - Disposition Axis I - Major Depression, recurrent, severe without psychosis Axis II - Deferred Axis III - Past Medical History:  
Diagnosis Date  Arthritis  Endocrine disease Hypothyroid  Hypertension  Ill-defined condition Timbo's Disease  Ill-defined condition Anxiety Axis IV - recent detox from methadone [place] V - 40 The Medical Doctor to Psychiatrist conference was not completed. The Medical Doctor is in agreement with Psychiatrist disposition because of (reason) Admission is recommended. The plan is admit to general psychiatry at [facility] [place]. The on-call Psychiatrist consulted was Dr. Sergio Torrez. The admitting Psychiatrist will be Dr. Sergio Torrez. The admitting Diagnosis is Depression. The Payor source is [facility] PPO Section II - Integrated Summary Summary:  Patient came in accompanied by his wife due to depression. Patient reported he was tapered off methadone by Dr Hiren Khanna and now he has \"no feelings. \"  Patient reported he doesn't feel pain or love or any sensations. Patient reported he cannot think, his heart races, and he cant sleep. Patient makes passive suicidal statements such as \"I wish I was dead. \"  Patient denied hx of suicide attempts or current plan. He has been admitted at [facility] ago for depression. Patient is alert and oriented. Patient is very negative and hopeless and helpless. Patient stated he cannot do anything and has withdrawn from all of his friends. Patient doesn't eat or sleep well per his report. Patient reported he doesn't even want to shower. Patient denied any homicidal ideation or symptoms of psychosis. Patient reported he is scared to be alone and is ruminating on past issues such as taking money from his 80year old mother who now needs money. Dr Shah Brought office referred him for possible admission and he is willing. The patienthas demonstrated mental capacity to provide informed consent. The information is given by the patient and spouse/SO. The Chief Complaint is depression . The Precipitant Factors are withdrawal off methadone, not able to work right now. Previous Hospitalizations: [place] 6-7 years ago. The patient has not previously been in restraints. Current Psychiatrist and/or  is NA. Lethality Assessment: 
 
The potential for suicide noted by the following: ideation . The potential for homicide is not noted. The patient has not been a perpetrator of sexual or physical abuse. There are not pending charges. The patient is felt to be at risk for self harm or harm to others. The attending nurse was advised that security has not been notified. Section III - Psychosocial 
The patient's overall mood and attitude is depressed. Feelings of helplessness and hopelessness are observed by verbal statements. Generalized anxiety is not observed. Panic is not observed. Phobias are not observed. Obsessive compulsive tendencies are not observed. Section IV - Mental Status Exam 
The patient's appearance shows no evidence of impairment. The patient's behavior shows no evidence of impairment. The patient is oriented to time, place, person and situation. The patient's speech shows no evidence of impairment. The patient's mood is depressed and is anxious. The range of affect is flat. The patient's thought content demonstrates no evidence of impairment. The thought process shows no evidence of impairment. The patient's perception shows no evidence of impairment. The patient's memory shows no evidence of impairment. The patient's appetite is decreased and shows signs of weight loss. The patient's sleep has evidence of insomnia. The patient shows no insight. The patient's judgement is psychologically impaired. Section V - Substance Abuse The patient is not using substances. Section VI - Living Arrangements The patient is . te patient lives with a spouse. The patient has 2 children   The patient does plan to return home upon discharge. The patient does not have legal issues pending. The patient's source of income comes from family. Mandaen and cultural practices have not been voiced at this time. The patient's greatest support comes from wife and this person will be involved with the treatment. The patient has not been in an event described as horrible or outside the realm of ordinary life experience either currently or in the past. 
The patient has not been a victim of sexual/physical abuse. Section VII - Other Areas of Clinical Concern The highest grade achieved is NA with the overall quality of school experience being described as NA. The patient is currently unemployed and speaks Cathyann Durie as a primary language. The patient has no communication impairments affecting communication. The patient's preference for learning can be described as: can read and write adequately. The patient's hearing is normal.  The patient's vision is impaired and  wears glasses or contacts.  
 
 
Lora Lane, YOHANA

## 2018-11-13 NOTE — ED PROVIDER NOTES
46 y.o. male with past medical history significant for arthritis, hypothyroidism, HTN, anxiety, and Timbo's disease who presents from home with chief complaint of a mental health problem. Pt reports he was tapered off of methadone and stopped taking Klonopin ~1 month ago. Since then, Pt has been having increased anxiety, feels that he \"has no feelings\", he feels helpless, and finds no waqas in activities. When asked about SI, Pt states he would \"just rather be dead\", but denies having a plan. Per Pt's wife, Pt's sx have been worse for ~1 week. Per wife, Pt was evaluated at the [facility] and was recommended ED evaluation. Pt does not currently have a psychiatrist or counselor. Pt denies prior hx of depression. There are no other acute medical concerns at this time. PCP: Mekhi Kc MD 
 
Note written by Gerry Andersen, as dictated by Leo Lewis MD 1:38 PM 
 
 
 
  
 
Past Medical History:  
Diagnosis Date  Arthritis  Endocrine disease Hypothyroid  Hypertension  Ill-defined condition Timbo's Disease  Ill-defined condition Anxiety Past Surgical History:  
Procedure Laterality Date  HX ORTHOPAEDIC Knee  HX ORTHOPAEDIC Hip History reviewed. No pertinent family history. Social History Socioeconomic History  Marital status:  Spouse name: Not on file  Number of children: Not on file  Years of education: Not on file  Highest education level: Not on file Social Needs  Financial resource strain: Not on file  Food insecurity - worry: Not on file  Food insecurity - inability: Not on file  Transportation needs - medical: Not on file  Transportation needs - non-medical: Not on file Occupational History  Not on file Tobacco Use  Smoking status: Never Smoker  Smokeless tobacco: Never Used Substance and Sexual Activity  Alcohol use: No  
  Alcohol/week: 0.0 oz  
  Drug use: No  
 Sexual activity: Not on file Other Topics Concern  Not on file Social History Narrative  Not on file ALLERGIES: Patient has no known allergies. Review of Systems Psychiatric/Behavioral: Positive for dysphoric mood and sleep disturbance. The patient is nervous/anxious. All other systems reviewed and are negative. Vitals:  
 11/13/18 1316 11/13/18 1336 BP:  138/90 Pulse: 82 84 Resp:  16 Temp:  98.3 °F (36.8 °C) SpO2: 98% 97% Weight:  95.3 kg (210 lb) Height:  5' 10\" (1.778 m) Physical Exam  
Constitutional: He is oriented to person, place, and time. He appears well-developed and well-nourished. No distress. HENT:  
Head: Normocephalic and atraumatic. Eyes: Conjunctivae are normal. No scleral icterus. Neck: Neck supple. No tracheal deviation present. Cardiovascular: Normal rate, regular rhythm, normal heart sounds and intact distal pulses. Exam reveals no gallop and no friction rub. No murmur heard. Pulmonary/Chest: Effort normal and breath sounds normal. He has no wheezes. He has no rales. Abdominal: Soft. He exhibits no distension. There is no tenderness. There is no rebound and no guarding. Musculoskeletal: He exhibits no edema. Neurological: He is alert and oriented to person, place, and time. Skin: Skin is warm and dry. No rash noted. Psychiatric:  
Depressed affect. Nursing note and vitals reviewed. Note written by Gerry Patiño, as dictated by Stew Woods MD 1:38 PM 
 
MDM Number of Diagnoses or Management Options Amount and/or Complexity of Data Reviewed Clinical lab tests: ordered and reviewed Tests in the radiology section of CPT®: ordered and reviewed Tests in the medicine section of CPT®: ordered and reviewed Procedures ED EKG interpretation: 
Rhythm: normal sinus rhythm; Rate (approx.): 77;  Axis: normal; ST/T wave: normal;  
 Note written by Gerry Lorenzo, as dictated by Marni Orozco MD 1:45 PM 
 
  
 
PROGRESS NOTE: 
3:16 PM 
Pt is medically cleared. He has fairly severe depression. He will be admitted for psychiatry.

## 2018-11-13 NOTE — ED NOTES
5 Pt's belongings checked and pt placed in gown. Belongings given to spouse. Pt contracts for safety. Call bell within reach. Tono [id number] at bedside. 1600 Pt resting comfortably on stretcher with spouse at bedside. 1715 Pt updated on plan of care. Pt resting comfortably on stretcher with spouse at bedside. 1730 Diet tray ordered. [employer] at bedside to clarify questions. 1815 Pt's spouse has left. Belongings placed at nurses' station with door open. No complaints at this time. Pt updated on plan of care.

## 2018-11-13 NOTE — ED TRIAGE NOTES
Patient reports being tapered off methadone at the end of September, now reporting \"no feelings anymore\" and not sleeping. States \"I wish I was dead, but I would never kill myself\".

## 2018-11-14 LAB
ATRIAL RATE: 77 BPM
CALCULATED P AXIS, ECG09: 26 DEGREES
CALCULATED R AXIS, ECG10: 25 DEGREES
CALCULATED T AXIS, ECG11: 9 DEGREES
DIAGNOSIS, 93000: NORMAL
P-R INTERVAL, ECG05: 136 MS
Q-T INTERVAL, ECG07: 370 MS
QRS DURATION, ECG06: 82 MS
QTC CALCULATION (BEZET), ECG08: 418 MS
VENTRICULAR RATE, ECG03: 77 BPM

## 2018-11-14 PROCEDURE — 74011250637 HC RX REV CODE- 250/637

## 2018-11-14 PROCEDURE — 74011250637 HC RX REV CODE- 250/637: Performed by: PSYCHIATRY & NEUROLOGY

## 2018-11-14 PROCEDURE — 65220000003 HC RM SEMIPRIVATE PSYCH

## 2018-11-14 RX ORDER — NALTREXONE HYDROCHLORIDE 50 MG/1
50 TABLET, FILM COATED ORAL DAILY
Status: ON HOLD | COMMUNITY
End: 2018-11-14

## 2018-11-14 RX ORDER — DULOXETIN HYDROCHLORIDE 20 MG/1
20 CAPSULE, DELAYED RELEASE ORAL DAILY
Status: DISCONTINUED | OUTPATIENT
Start: 2018-11-14 | End: 2018-11-15

## 2018-11-14 RX ORDER — GABAPENTIN 300 MG/1
300 CAPSULE ORAL 3 TIMES DAILY
Status: ON HOLD | COMMUNITY
End: 2018-11-14

## 2018-11-14 RX ORDER — CLONIDINE HYDROCHLORIDE 0.1 MG/1
0.1 TABLET ORAL
COMMUNITY
End: 2018-11-16

## 2018-11-14 RX ORDER — HYDROXYZINE 25 MG/1
25 TABLET, FILM COATED ORAL
Status: DISCONTINUED | OUTPATIENT
Start: 2018-11-14 | End: 2018-11-15 | Stop reason: SDUPTHER

## 2018-11-14 RX ORDER — LAMOTRIGINE 25 MG/1
75 TABLET ORAL DAILY
COMMUNITY
End: 2018-11-16

## 2018-11-14 RX ORDER — QUETIAPINE FUMARATE 50 MG/1
50 TABLET, FILM COATED ORAL
COMMUNITY
End: 2018-11-16

## 2018-11-14 RX ADMIN — HYDROXYZINE HYDROCHLORIDE 25 MG: 25 TABLET, FILM COATED ORAL at 17:05

## 2018-11-14 RX ADMIN — IBUPROFEN 400 MG: 400 TABLET, FILM COATED ORAL at 21:38

## 2018-11-14 RX ADMIN — ZOLPIDEM TARTRATE 10 MG: 10 TABLET ORAL at 22:03

## 2018-11-14 RX ADMIN — LORAZEPAM 1 MG: 1 TABLET ORAL at 06:12

## 2018-11-14 RX ADMIN — DULOXETINE HYDROCHLORIDE 20 MG: 20 CAPSULE, DELAYED RELEASE ORAL at 13:43

## 2018-11-14 RX ADMIN — LORAZEPAM 1 MG: 1 TABLET ORAL at 01:31

## 2018-11-14 NOTE — BH NOTES
GROUP THERAPY PROGRESS NOTE Reed Gerber participated in the General Unit's Process Group, with a focus on setting a direction or goal, identifying feelings and planning for the day. Group time: 75 minutes. Personal goal for participation: To increase the capacity to improve ones mood and structure. Goal orientation: The patient will be able to identify a direction or goal for themselves, identify their feelings, develop a plan for structuring their day, and continue working on a discharge planning. Group therapy participation: With prompting, this patient actively participated in the group. Therapeutic interventions reviewed and discussed: The group members were asked to introduce themselves to each other and to see if they could identify an emotion they are having and/or let the group know what they want to focus on for the day as they continue to make discharge plans. Impression of participation: The patient said he was \"nervous. ..and anxious. \" He also admitted to being detoxed for Naltrexone for chronic pain and recently had surgery for his chronic pain and an insert to see if it helps him. He indicated that he had worked in lawn and forestry work for many years but that his boss and supervisor  about four years ago. 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 He admitted to feeling depressed most of his life and that recently he feels like he has no feelings, feeling \"empty. \" He also said he has not been sleeping well and that his food has been tasteless. He added that he \"worries\" about things that \"way-far into the future\" and that this worry might be exaggerated. His affect was depressed and anxious. His mood matched his affect.  This was the patient's first process group with the undersigned. The patient was alert, generally oriented, and The patient expressed no current SI/HI and displayed no overt psychotic symptoms.

## 2018-11-14 NOTE — ED NOTES
TRANSFER - OUT REPORT: 
 
Verbal report given to Radha Monzon RN(name) on Mary Lou Hallman  being transferred to (unit) for routine progression of care Report consisted of patients Situation, Background, Assessment and  
Recommendations(SBAR). Information from the following report(s) SBAR, ED Summary, Procedure Summary, MAR and Recent Results was reviewed with the receiving nurse. Lines:    
 
Opportunity for questions and clarification was provided. Patient transported with: 
 Registered Nurse HPD Officer

## 2018-11-14 NOTE — H&P
INITIAL PSYCHIATRIC EVALUATION   
   
 
IDENTIFICATION:   
Patient Name  Hilary Sher Date of Birth 1965 [facility] [id number] Medical Record Number  [id number] Age  46 y.o. PCP Kary Conley MD  
Admit date:  11/13/2018 Room Number  729/01  @ [facility]  
Date of Service  11/14/2018 HISTORY  
 
   
REASON FOR HOSPITALIZATION: 
CC: Pt admitted  voluntary basis for depression. As per pt\" I felt like dying\". HISTORY OF PRESENT ILLNESS:   
The patient, Hilary Sher, is a 46 y.o. WHITE OR  male with 2 y.o. male with past medical history significant for arthritis, hypothyroidism, HTN, anxiety, and Timbo's disease who presents with difficulty sleeping, having no feeling, feelin empty, withdrawal, lack of moitivation, decreased appetite, depressed mood, anxiety. Pt reports he felt like dying. Pt reports he was tapered off of methadone( which he was prescribed for back pain) and stopped taking Klonopin ~1 month ago. Since then, Pt has been having increased anxiety, feels that he \"has no feelings\", he feels helpless, and finds no waqas in activities. Pt reports he got Nalterxone implant in OCT this yr. As per ER note:  Pt states he would \"just rather be dead\", but denies having a plan. Per Pt's wife, Pt's sx have been worse for ~1 week. Per wife, Pt was evaluated at the [facility] and was recommended ED evaluation. Pt deneid auditory/visual hallucinations, deneid OCD manic symptoms, deneid substance use currently. , no drinking alcohol. Past [facility] history:  6 year ago admitted at [address] [place] [address] for depression, no suicidala ttempts, h/o being started on cumbalta, lamictal, but pt never took it more than few days Lamictal, doensot take, clonidine 0.1 mg hs ALLERGIES: No Known Allergies MEDICATIONS PRIOR TO ADMISSION:  
Medications Prior to Admission Medication Sig  
 nitroglycerin (NITROSTAT) 0.4 mg SL tablet Take 1 Tab by mouth every five (5) minutes as needed for Chest Pain. Sit/Lay down then put one tab under the tongue every 5 minutes as needed for chest pain for 3 doses  LORazepam (ATIVAN) 1 mg tablet Take 1 mg by mouth every four (4) hours as needed for Anxiety.  naloxone 2 mg/actuation spry Use 1 spray intranasally into 1 nostril. Use a new Narcan nasal spray for subsequent doses and administer into alternating nostrils. May repeat every 2 to 3 minutes as needed.  metoprolol tartrate (LOPRESSOR) 25 mg tablet Take 1 Tab by mouth two (2) times a day.  atorvastatin (LIPITOR) 20 mg tablet Take 1 Tab by mouth daily. Lifelong medicine for cholesterol  raNITIdine (ZANTAC) 150 mg tablet Take 1 Tab by mouth two (2) times a day. For suspected heartburn- refills per PCP  
 busPIRone (BUSPAR) 10 mg tablet Take 10 mg by mouth three (3) times daily.  methadone (DOLOPHINE) 10 mg tablet Take 10 mg by mouth every six (6) hours as needed for Pain. 1 AM 2 Noon 1 [facility]  
  
PAST MEDICAL HISTORY:  
Past Medical History:  
Diagnosis Date  Arthritis  Endocrine disease Hypothyroid  Hypertension  Ill-defined condition Timbo's Disease  Ill-defined condition Anxiety Past Surgical History:  
Procedure Laterality Date  HX ORTHOPAEDIC Knee  HX ORTHOPAEDIC Hip SOCIAL HISTORY:, has 2 children, 18 nad 15, was taking money from mother, stealing money?unemploed Social History Socioeconomic History  Marital status:  Spouse name: Not on file  Number of children: Not on file  Years of education: Not on file  Highest education level: Not on file Social Needs  Financial resource strain: Not on file  Food insecurity - worry: Not on file  Food insecurity - inability: Not on file  Transportation needs - medical: Not on file  Transportation needs - non-medical: Not on file Occupational History  Not on file Tobacco Use  Smoking status: Never Smoker  Smokeless tobacco: Never Used Substance and Sexual Activity  Alcohol use: No  
  Alcohol/week: 0.0 oz  Drug use: No  
 Sexual activity: Not on file Other Topics Concern  Not on file Social History Narrative  Not on file FAMILY HISTORY: History reviewed. No pertinent family history. History reviewed. No pertinent family history. REVIEW OF SYSTEMS:  
 
Pertinent items are noted in the History of Present Illness. All other Systems reviewed and are considered negative. [facility] MENTAL STATUS EXAM (MSE): MSE FINDINGS ARE WITHIN NORMAL LIMITS (WNL) UNLESS OTHERWISE STATED BELOW. ( ALL OF THE BELOW CATEGORIES OF THE MSE HAVE BEEN REVIEWED (reviewed 11/14/2018) AND UPDATED AS DEEMED APPROPRIATE ) General Presentation age appropriate, cooperative Orientation oriented to time, place and person Vital Signs  See below (reviewed 11/14/2018); Vital Signs (BP, Pulse, & Temp) are within normal limits if not listed below. Gait and Station Stable/steady, no ataxia Musculoskeletal System No extrapyramidal symptoms (EPS); no abnormal muscular movements or Tardive Dyskinesia (TD); muscle strength and tone are within normal limits Language No aphasia or dysarthria Speech:  normal pitch and normal volume Thought Processes logical; normal rate of thoughts; fair abstract reasoning/computation Thought Associations goal directed Thought Content free of delusions Suicidal Ideations no plan  and no intention Homicidal Ideations no plan  and no intention Mood:  anxious Affect:  constricted Memory recent  intact Memory remote:  intact Concentration/Attention:  distractable Fund of Knowledge average Insight:  fair Reliability good Judgment:  fair VITALS:    
Patient Vitals for the past 24 hrs: 
 Temp Pulse Resp BP SpO2  
11/14/18 1210 98.1 °F (36.7 °C) 100 16 119/76 97 % 11/14/18 0840 98.4 °F (36.9 °C) 95 16 116/85 97 % 11/14/18 0610  81 16 130/79 97 % 11/13/18 2130 98.6 °F (37 °C) 89 16 167/80 99 % 11/13/18 2000 98.4 °F (36.9 °C) 95 14 114/65 95 % 11/13/18 1900  87 11 (!) 141/92 96 % 11/13/18 1800  87 13 145/85 97 % 11/13/18 1700  86 16 138/87 97 % 11/13/18 1600  88 14 138/82 97 % 11/13/18 1500  92 16 139/82 96 % 11/13/18 1400  87 26 132/80 96 % 11/13/18 1336 98.3 °F (36.8 °C) 84 16 138/90 97 % 11/13/18 1316  82   98 % Wt Readings from Last 3 Encounters:  
11/13/18 95.3 kg (210 lb) 04/20/18 89.8 kg (197 lb 16 oz) 04/20/18 89.8 kg (198 lb) Temp Readings from Last 3 Encounters:  
11/14/18 98.1 °F (36.7 °C)  
04/20/18 97.8 °F (36.6 °C)  
04/19/18 98.4 °F (36.9 °C) BP Readings from Last 3 Encounters:  
11/14/18 119/76  
04/20/18 (!) 155/95  
04/20/18 (!) 130/98 Pulse Readings from Last 3 Encounters:  
11/14/18 100  
04/20/18 78  
04/20/18 88 DATA LABORATORY DATA: 
Labs Reviewed CBC WITH AUTOMATED DIFF - Abnormal; Notable for the following components:  
    Result Value IMMATURE GRANULOCYTES 1 (*) All other components within normal limits METABOLIC PANEL, COMPREHENSIVE - Abnormal; Notable for the following components: BUN/Creatinine ratio 11 (*) A-G Ratio 1.0 (*) All other components within normal limits DRUG SCREEN, URINE - Abnormal; Notable for the following components: BENZODIAZEPINES POSITIVE (*) All other components within normal limits SALICYLATE - Abnormal; Notable for the following components:  
 Salicylate level <7.7 (*) All other components within normal limits ACETAMINOPHEN - Abnormal; Notable for the following components:  
 Acetaminophen level <2 (*) All other components within normal limits ETHYL ALCOHOL  
SAMPLES BEING HELD Admission on 11/13/2018 Component Date Value Ref Range Status  WBC 11/13/2018 8.3  4.1 - 11.1 K/uL Final  
 RBC 11/13/2018 5.20  4. 10 - 5.70 M/uL Final  
  HGB 11/13/2018 15.8  12.1 - 17.0 g/dL Final  
 HCT 11/13/2018 47.6  36.6 - 50.3 % Final  
 MCV 11/13/2018 91.5  80.0 - 99.0 FL Final  
 MCH 11/13/2018 30.4  26.0 - 34.0 PG Final  
 MCHC 11/13/2018 33.2  30.0 - 36.5 g/dL Final  
 RDW 11/13/2018 13.3  11.5 - 14.5 % Final  
 PLATELET 84/55/5863 183  150 - 400 K/uL Final  
 MPV 11/13/2018 10.0  8.9 - 12.9 FL Final  
 NRBC 11/13/2018 0.0  0  WBC Final  
 ABSOLUTE NRBC 11/13/2018 0.00  0.00 - 0.01 K/uL Final  
 NEUTROPHILS 11/13/2018 75  32 - 75 % Final  
 LYMPHOCYTES 11/13/2018 16  12 - 49 % Final  
 MONOCYTES 11/13/2018 8  5 - 13 % Final  
 EOSINOPHILS 11/13/2018 0  0 - 7 % Final  
 BASOPHILS 11/13/2018 1  0 - 1 % Final  
 IMMATURE GRANULOCYTES 11/13/2018 1* 0.0 - 0.5 % Final  
 ABS. NEUTROPHILS 11/13/2018 6.2  1.8 - 8.0 K/UL Final  
 ABS. LYMPHOCYTES 11/13/2018 1.3  0.8 - 3.5 K/UL Final  
 ABS. MONOCYTES 11/13/2018 0.7  0.0 - 1.0 K/UL Final  
 ABS. EOSINOPHILS 11/13/2018 0.0  0.0 - 0.4 K/UL Final  
 ABS. BASOPHILS 11/13/2018 0.0  0.0 - 0.1 K/UL Final  
 ABS. IMM. GRANS.  11/13/2018 0.0  0.00 - 0.04 K/UL Final  
 DF 11/13/2018 AUTOMATED    Final  
 Sodium 11/13/2018 140  136 - 145 mmol/L Final  
 Potassium 11/13/2018 4.1  3.5 - 5.1 mmol/L Final  
 Chloride 11/13/2018 107  97 - 108 mmol/L Final  
 CO2 11/13/2018 25  21 - 32 mmol/L Final  
 Anion gap 11/13/2018 8  5 - 15 mmol/L Final  
 Glucose 11/13/2018 95  65 - 100 mg/dL Final  
 BUN 11/13/2018 13  6 - 20 MG/DL Final  
 Creatinine 11/13/2018 1.14  0.70 - 1.30 MG/DL Final  
 BUN/Creatinine ratio 11/13/2018 11* 12 - 20   Final  
 GFR est AA 11/13/2018 >60  >60 ml/min/1.73m2 Final  
 GFR est non-AA 11/13/2018 >60  >60 ml/min/1.73m2 Final  
 Calcium 11/13/2018 9.1  8.5 - 10.1 MG/DL Final  
 Bilirubin, total 11/13/2018 0.5  0.2 - 1.0 MG/DL Final  
 ALT (SGPT) 11/13/2018 39  12 - 78 U/L Final  
 AST (SGOT) 11/13/2018 20  15 - 37 U/L Final  
  Alk. phosphatase 11/13/2018 78  45 - 117 U/L Final  
 Protein, total 11/13/2018 7.8  6.4 - 8.2 g/dL Final  
 Albumin 11/13/2018 3.9  3.5 - 5.0 g/dL Final  
 Globulin 11/13/2018 3.9  2.0 - 4.0 g/dL Final  
 A-G Ratio 11/13/2018 1.0* 1.1 - 2.2   Final  
 ALCOHOL(ETHYL),SERUM 11/13/2018 <10  <10 MG/DL Final  
 AMPHETAMINES 11/13/2018 NEGATIVE   NEG   Final  
 BARBITURATES 11/13/2018 NEGATIVE   NEG   Final  
 BENZODIAZEPINES 11/13/2018 POSITIVE* NEG   Final  
 COCAINE 11/13/2018 NEGATIVE   NEG   Final  
 METHADONE 11/13/2018 NEGATIVE   NEG   Final  
 OPIATES 11/13/2018 NEGATIVE   NEG   Final  
 PCP(PHENCYCLIDINE) 11/13/2018 NEGATIVE   NEG   Final  
 THC (TH-CANNABINOL) 11/13/2018 NEGATIVE   NEG   Final  
 Drug screen comment 11/13/2018 (NOTE)   Final  
 Ventricular Rate 11/13/2018 77  BPM Final  
 Atrial Rate 11/13/2018 77  BPM Final  
 P-R Interval 11/13/2018 136  ms Final  
 QRS Duration 11/13/2018 82  ms Final  
 Q-T Interval 11/13/2018 370  ms Final  
 QTC Calculation (Bezet) 11/13/2018 418  ms Final  
 Calculated P Axis 11/13/2018 26  degrees Final  
 Calculated R Axis 11/13/2018 25  degrees Final  
 Calculated T Axis 11/13/2018 9  degrees Final  
 Diagnosis 11/13/2018    Final  
                 Value:Normal sinus rhythm When compared with ECG of 20-APR-2018 14:17, No significant change was found Confirmed by Shamar Merino M.D., Shameka Welch ([id number]) on 11/14/2018 10:11:39 AM 
  
 SAMPLES BEING HELD 11/13/2018 1BLUE 1UHOLD   Final  
 COMMENT 11/13/2018 Add-on orders for these samples will be processed based on acceptable specimen integrity and analyte stability, which may vary by analyte. Final  
 Salicylate level 02/34/9426 <1.7* 2.8 - 20.0 MG/DL Final  
 Acetaminophen level 11/13/2018 <2* 10 - 30 ug/mL Final  
 
  
RADIOLOGY REPORTS: 
Results from Hospital Encounter encounter on 04/20/18 XR CHEST PORT Narrative EXAM:  XR CHEST PORT INDICATION:  chest pain COMPARISON:  4/19/2018 FINDINGS: A portable AP radiograph of the chest was obtained at 1456 hours. The 
patient is on a cardiac monitor. There is a persistent 9 mm nodular focus 
projected inferolateral to the left hilum. The cardiac and mediastinal contours 
and pulmonary vascularity are normal.  The bones and soft tissues are grossly 
within normal limits. Impression IMPRESSION: No acute cardiopulmonary process seen. Possible left lung granuloma, 
for which comparison to any prior outside films would be recommended to document 
stability. No results found. MEDICATIONS ALL MEDICATIONS Current Facility-Administered Medications Medication Dose Route Frequency  ziprasidone (GEODON) 20 mg in sterile water (preservative free) 1 mL injection  20 mg IntraMUSCular BID PRN  
 OLANZapine (ZyPREXA) tablet 5 mg  5 mg Oral Q6H PRN  
 benztropine (COGENTIN) tablet 2 mg  2 mg Oral BID PRN  
 benztropine (COGENTIN) injection 2 mg  2 mg IntraMUSCular BID PRN  
 LORazepam (ATIVAN) injection 2 mg  2 mg IntraMUSCular Q4H PRN  
 LORazepam (ATIVAN) tablet 1 mg  1 mg Oral Q4H PRN  
 zolpidem (AMBIEN) tablet 10 mg  10 mg Oral QHS PRN  
 acetaminophen (TYLENOL) tablet 650 mg  650 mg Oral Q4H PRN  
 ibuprofen (MOTRIN) tablet 400 mg  400 mg Oral Q8H PRN  
 magnesium hydroxide (MILK OF MAGNESIA) 400 mg/5 mL oral suspension 30 mL  30 mL Oral DAILY PRN  
 nicotine (NICODERM CQ) 21 mg/24 hr patch 1 Patch  1 Patch TransDERmal DAILY PRN  
  
SCHEDULED MEDICATIONS Current Facility-Administered Medications Medication Dose Route Frequency ASSESSMENT & PLAN The patient, Char Rider, is a 46 y.o.  male who presents at this time for treatment of the following diagnoses: 
Patient Active Hospital Problem List: 
 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 
 
 
 
 will continue to monitor blood levels (Depakote, Tegretol, lithium, clozapine---a drug with a narrow therapeutic index= NTI) and associated labs for drug therapy implemented that require intense monitoring for toxicity as deemed appropriate based on current medication side effects and pharmacodynamically determined drug 1/2 lives. A coordinated, multidisplinary treatment team (includes the nurse, unit pharmcist,  and writer) round was conducted for this initial evaluation with the patient present. he following regarding medications was addressed during rounds with patient:  
the risks and benefits of the proposed medication. The patient was given the opportunity to ask questions. Informed consent given to the use of the above medications. I will continue to adjust psychiatric and non-psychiatric medications (see above \"medication\" section and orders section for details) as deemed appropriate & based upon diagnoses and response to treatment. I have reviewed admission (and previous/old) labs and medical tests in the EHR and or transferring hospital documents. I will continue to order blood tests/labs and diagnostic tests as deemed appropriate and review results as they become available (see orders for details). I have reviewed old psychiatric and medical records available in the EHR. I Will order additional psychiatric records from other institutions to further elucidate the nature of patient's psychopathology and review once available. I will gather additional collateral information from friends, family and o/p treatment team to further elucidate the nature of patient's psychopathology and baselline level of psychiatric functioning. ESTIMATED LENGTH OF STAY:   
3-7 days STRENGTHS: 
Access to housing/residential stability and Interpersonal/supportive relationships (family, friends, peers) SIGNED:   
Kimberlee Hernandez MD 
11/14/2018

## 2018-11-14 NOTE — INTERDISCIPLINARY ROUNDS
Behavioral Health Interdisciplinary Rounds Patient Name: Corin Fair  Age: 46 y.o. Room/Bed:  729/ Primary Diagnosis: <principal problem not specified> Admission Status: Voluntary Readmission within 30 days: no 
Power of  in place: no 
Patient requires a blocked bed: no          Reason for blocked bed: n/a 
 
VTE Prophylaxis: Not indicated Mobility needs/Fall risk: no 
Flu Vaccine : yes - PTA Nutritional Plan: no 
Consults:         
Labs/Testing due today?: no 
 
Sleep hours: 6.0 Participation in Care/Groups:  New pt Medication Compliant?:  
PRNS (last 24 hours): Sleep Aid, Antianxiety Restraints (last 24 hours):  no 
  
CIWA (range last 24 hours): COWS (range last 24 hours): Alcohol screening (AUDIT) completed -   AUDIT Score: 2 If applicable, date SBIRT discussed in treatment team AND documented:  
 
Tobacco - patient is a smoker: Have You Used Tobacco in the Past 30 Days: No 
Illegal Drugs use: Have You Used Any Illegal Substances Over the Past 12 Months: No 
 
24 hour chart check complete: yes Patient goal(s) for today: Meet with treatment team 
Treatment team focus/goals: psychosocial 
Progress note: Pt reports increased depression, isolation, decreased interest in activities LOS:  1  Expected LOS: TBD Financial concerns/prescription coverage:  [employer] Date of last family contact: None Family requesting physician contact today: No 
Discharge plan: Return home when stable for discharge Guns in the home: Outpatient provider(s): Dr. Vazquez Staff Participating treatment team members: VINCE Prescott; Dr. Chele Morgan MD; Rock Angie RN; Nora Cooper, LokeshD

## 2018-11-14 NOTE — PROGRESS NOTES
Admission Medication Reconciliation: 
 
Information obtained from:  Patient interview/[facility] /RxQuery/Communication with [address] [place] [address] and [facility] Comments/Recommendations: Updated PTA meds/reviewed patient's allergies. 1)  Patient reports that he recently had a lot of medications filled but either never started them or took only 1-2 tablets before stopping them 
- 8/21/18: sertraline 50mg daily - patient states he took for 1 week and it gave him intense anxiety so he stopped - 8/21/18: trazodone 50mg QHS 
- 9/18/18: metoprolol 25mg BID 
- 9/22/18: baclofen 10mg every 8 hours PRN 
- 9/22/18: clonidine 0.1mg every 8 hours - 9/22/18: olanzapine 10mg as directed - 9/22/18: tramadol  
- 10/23/18: duloxetine 60mg daily 
- 10/29/18: hydroxyzine pamoate 25mg BID PRN 
- 10/31/18: meloxicam 15mg daily Additionally, stated that he was on methadone for chronic back pain. [facility] methadone in the end of September and received a naltrexone implant in this abdomen. States that he has not felt the same since and is interested in having the implant removed. 2)  Medication changes (since last review): Added 
- clonidine 0.1mg QHS (prescription is written for BID) - lamotrigine 75mg daily (per patient recently instructed by provider to increase the dose from 50mg to 75mg, patient feels that he has gone \"downhill\" since dose change) - quetiapine 50mg QHS Adjusted 
- none Removed 
- methadone (stopped end of Sept) 
- buspirone - lorazepam 
- atorvastatin 
- metoprolol 
- nitroglycerin 
- ranitidine 
- naloxone 3)  The [place] Prescription Monitoring Program () was assessed to determine fill history of any controlled medications. In the 6 months the patient has filled the following controlled medications: 
- 9/22/18: diazepam 10mg q8h PRN, #40 - Cindy Patel - 9/22/18: tramadol 50mg q8h PRN, #40 - Cindy Patel - 9/11/18: methadone 10mg QID, #120 - Digna Sample - 8/14/18: methadone 10mg QID, #120 - Digna Sample - 8/14/18: clonazepam 1mg BID, # 60 - Liborio Dottie - 7/27/18: lorazepam 1mg, #90 - Shanique Master - 7/18/18: methadone 10mg QID, #120 - Digna Sample - 7/3/18: clonazepam 1mg, #60 - Liborio Dottie - 6/19/18: methadone 10mg QID, #120 - Digna Sample - 6/18/18: lorazepam 1mg, #90 - Shanique Master - 5/23/18: methadone 10mg QID, #120 - Digna Sample - 5/21/18: lorazepam 1mg, #90 - Shanique Master Allergies:  Patient has no known allergies. Significant PMH/Disease States:  
Past Medical History:  
Diagnosis Date  Arthritis  Endocrine disease Hypothyroid  Hypertension  Ill-defined condition Timbo's Disease  Ill-defined condition Anxiety Chief Complaint for this Admission: Chief Complaint Patient presents with  Mental Health Problem Prior to Admission Medications:  
Prior to Admission Medications Prescriptions Last Dose Informant Patient Reported? Taking? OTHER,NON-FORMULARY,   Yes Yes Sig: Naltrexone implant in abdomen - [facility] patient [facility] end of Sept  
QUEtiapine (SEROQUEL) 50 mg tablet   Yes Yes Sig: Take 50 mg by mouth nightly. cloNIDine HCl (CATAPRES) 0.1 mg tablet   Yes Yes Sig: Take 0.1 mg by mouth nightly. lamoTRIgine (LAMICTAL) 25 mg tablet   Yes Yes Sig: Take 75 mg by mouth daily. Facility-Administered Medications: None Celeste Gilmore, PharmD, BCPP, BCPS Clinical Pharmacy Specialist, [facility]

## 2018-11-14 NOTE — BH NOTES
PRN Medication Documentation Specific patient behavior that led to need for PRN medication: c/o anxiety Staff interventions attempted prior to PRN being given: coping skills PRN medication given: atarax Patient response/effectiveness of PRN medication:good,tl aware

## 2018-11-14 NOTE — BH NOTES
PSYCHOSOCIAL ASSESSMENT 
:Patient identifying info: 
Bobby Dunham is a 46 y.o., male admitted 11/13/2018  1:17 PM  
 
Presenting problem and precipitating factors: Patient came to [facility] with his wife due to increased depression and thoughts of wanting to die. Pt denies SI, stating that he could never do that to his family. Pt reports increased isolation, feeling poorly about his lack of career, and feeling overwhelmed. Pt reports he has been prescribed multiple antidepressants, but he refuses to take them after reading about side effects online. Pt reported that when he did try medications on short trials, he stopped them due to feeling worse. Pt endorses feelings of helplessness and hopelessness. He reports \"I don't feel anything; I can't taste anything. I feel no waqas, love, anger, nothing. \"  Pt endorses feelings of guilt regarding his mother's current financial state and that he has taken money from her in the past.  Pt reports poor sleep and poor appetite. Mental status assessment: Alert, oriented, calm, cooperative Collateral information: Satinder Teresa (wife [phone]) Current psychiatric /substance abuse providers and contact info: Dr. Lesa Hutchinson (detox only) Previous psychiatric/substance abuse providers and response to treatment: 1 previous inpatient psychiatric hospitalization [facility] 2012) Family history of mental illness or substance abuse: Mother and sister suffer from depression Substance abuse history:  Methadone (prescribed) Social History Tobacco Use  Smoking status: Never Smoker  Smokeless tobacco: Never Used Substance Use Topics  Alcohol use: No  
  Alcohol/week: 0.0 oz History of biomedical complications associated with substance abuse: N/A Patient's current acceptance of treatment or motivation for change: Good Family constellation: Wife, 2 children Is significant other involved? Yes, wife Describe support system: Family Describe living arrangements and home environment: Lives with wife and children. Health issues:  
Hospital Problems  Date Reviewed: 2018 Codes Class Noted POA Depression ICD-10-CM: F32.9 ICD-9-CM: 337  2018 Unknown Trauma history: None indicated Legal issues: None indicated History of  service: No 
 
Financial status: Income from family Anabaptism/cultural factors:  
 
Education/work history: Currently unemployed due to caring for wife; former work in [address] [place] [address] Have you been licensed as a health care professional (current or ): No 
 
Leisure and recreation preferences: Spending time with family Describe coping skills: Limited Khris Manan 2018

## 2018-11-14 NOTE — PROGRESS NOTES
Problem: Falls - Risk of 
Goal: *Absence of Falls Document Fitzflor Morrow Fall Risk and appropriate interventions in the flowsheet. Outcome: Progressing Towards Goal 
Fall Risk Interventions: 
Medication Interventions: Teach patient to arise slowly Elimination Interventions: Bed/chair exit alarm History of Falls Interventions: Door open when patient unattended 2345 Pt appears asleep in bed. Respirations even and unlabored. Will monitor with Q 15 safety checks. 0130 PRN Medication Documentation Specific patient behavior that led to need for PRN medication: Anxiety, restlessness Staff interventions attempted prior to PRN being given: calm environment, previous prn PRN medication given: Ativan 1 mg po Patient response/effectiveness of PRN medication: 0220 Pt appears asleep in bed. Respirations even and unlabored. 3012 PRN Medication Documentation Specific patient behavior that led to need for PRN medication: anxiety, \"heart is beating strong\" - vitals/HR stable Staff interventions attempted prior to PRN being given: therapeutic listening, deep breathing PRN medication given: Ativan 1 mg po Patient response/effectiveness of PRN medication: will monitor

## 2018-11-14 NOTE — BH NOTES
GROUP THERAPY PROGRESS NOTE Selam Guerrero is participating in [place]. Group time: 30 minutes Personal goal for participation: get some sleep, get feelings back. Goal orientation: personal 
 
Group therapy participation: active Therapeutic interventions reviewed and discussed: Students discussed goals and the importance of short and long term, Tech discussed schedules and unit rule. Encouraged goals for unit daily. Impression of participation: very active, determined

## 2018-11-14 NOTE — PROGRESS NOTES
Problem: Falls - Risk of      These goals will be met by 11/21/18 Goal: *Absence of Falls Document Cece Askew Fall Risk and appropriate interventions in the flowsheet. Outcome: Progressing Towards Goal 
Fall Risk Interventions: 
  
Patient is absent of falls. Medication Interventions: Teach patient to arise slowly Elimination Interventions: Bed/chair exit alarm History of Falls Interventions: Door open when patient unattended Problem: Depressed Mood (Adult/Pediatric) Goal: *STG: Participates in treatment plan Outcome: Progressing Towards Goal 
Patient is participating in treatment plan. Patient's history, medications discussed. Patient denies A/V hallucinations,  Patient is anxious, affect congruent, out on the unit, med and meal compliant, attending groups, appropriate with peers. Goal: *STG: Complies with medication therapy Outcome: Progressing Towards Goal 
Patient complies with medication therapy. [address] [facility] 60 Master Treatment Plan for Melissa Colin Date Treatment Plan Initiated: 11/14/18 Treatment Plan Modalities: 
Type of Modality Amount (x minutes) Frequency (x/week) Duration (x days) Name of Responsible Staff Community & wrap-up meetings to encourage peer interactions 15 7 1 SRI Booker Group psychotherapy to assist in building coping skills and internal controls 60 7 [address] Therapeutic activity groups to build coping skills 60 7 1 Lázaro Godoy Psychoeducation in group setting to address:  
Medication education [address], RN Coping skills Relaxation techniques Symptom management Discharge planning [address] [place] [address] 71 Spirituality [address] [facility] 60 1 1 volunteer Recovery/AA/NA 
 60 3 1 volunteer Physician medication management 15 7 1 Dr. Hetal Prince Family meeting/discharge planning [facility] Raad James

## 2018-11-14 NOTE — PROGRESS NOTES
Problem: Discharge Planning Goal: *Discharge to safe environment Outcome: Progressing Towards Goal 
Patient identifies home as a safe environment. Patient will return home upon discharge. Goal: *Knowledge of medication management Outcome: Not Progressing Towards Goal 
Patient is resistant to take medications. Patient is afraid to take medications due to side effects. Goal: *Knowledge of discharge instructions Outcome: Progressing Towards Goal 
Patient verbalizes understanding of goals for treatment and safe discharge plan.

## 2018-11-15 LAB — TSH SERPL DL<=0.05 MIU/L-ACNC: 1.78 UIU/ML (ref 0.36–3.74)

## 2018-11-15 PROCEDURE — 84443 ASSAY THYROID STIM HORMONE: CPT

## 2018-11-15 PROCEDURE — 74011250637 HC RX REV CODE- 250/637: Performed by: PSYCHIATRY & NEUROLOGY

## 2018-11-15 PROCEDURE — 74011250637 HC RX REV CODE- 250/637

## 2018-11-15 PROCEDURE — 36415 COLL VENOUS BLD VENIPUNCTURE: CPT

## 2018-11-15 PROCEDURE — 65220000003 HC RM SEMIPRIVATE PSYCH

## 2018-11-15 RX ORDER — HYDROXYZINE 25 MG/1
25 TABLET, FILM COATED ORAL
Status: DISCONTINUED | OUTPATIENT
Start: 2018-11-15 | End: 2018-11-16 | Stop reason: HOSPADM

## 2018-11-15 RX ORDER — DULOXETIN HYDROCHLORIDE 30 MG/1
30 CAPSULE, DELAYED RELEASE ORAL DAILY
Status: DISCONTINUED | OUTPATIENT
Start: 2018-11-16 | End: 2018-11-16 | Stop reason: HOSPADM

## 2018-11-15 RX ADMIN — HYDROXYZINE HYDROCHLORIDE 25 MG: 25 TABLET, FILM COATED ORAL at 14:17

## 2018-11-15 RX ADMIN — ZOLPIDEM TARTRATE 10 MG: 10 TABLET ORAL at 21:32

## 2018-11-15 RX ADMIN — ACETAMINOPHEN 650 MG: 325 TABLET ORAL at 11:20

## 2018-11-15 RX ADMIN — DULOXETINE HYDROCHLORIDE 20 MG: 20 CAPSULE, DELAYED RELEASE ORAL at 08:45

## 2018-11-15 NOTE — INTERDISCIPLINARY ROUNDS
Behavioral Health Interdisciplinary Rounds Patient Name: Azalea Henson  Age: 46 y.o. Room/Bed:  729/ Primary Diagnosis: <principal problem not specified> Admission Status: Voluntary Readmission within 30 days: no 
Power of  in place: no 
Patient requires a blocked bed: no          Reason for blocked bed: VTE Prophylaxis: No 
 
Mobility needs/Fall risk: no 
Flu Vaccine : no  
Nutritional Plan: no 
Consults:         
Labs/Testing due today?: yes Sleep hours:  6.25 Participation in Care/Groups:  yes Medication Compliant?: Yes PRNS (last 24 hours): Antianxiety, Sleep Aid and Pain Restraints (last 24 hours):  no 
  
CIWA (range last 24 hours): COWS (range last 24 hours): Alcohol screening (AUDIT) completed -   AUDIT Score: 2 If applicable, date SBIRT discussed in treatment team AND documented:  
 
Tobacco - patient is a smoker: Have You Used Tobacco in the Past 30 Days: No 
Illegal Drugs use: Have You Used Any Illegal Substances Over the Past 12 Months: No 
 
24 hour chart check complete: yes Patient goal(s) for today: Engage in unit activities Treatment team focus/goals: Provide info on PHP Progress note: Pt reports feeling better; interested in PHP 
 
LOS:  2  Expected LOS: TBD Financial concerns/prescription coverage:  [employer] Date of last family contact:      
Family requesting physician contact today:  No 
Discharge plan: Return home when stable for discharge Guns in the home: No       
Outpatient provider(s): Dr. Uday Tarango; link to therapist and PHP Participating treatment team members: VINCE Garcia; Dr. Pantera Jaime MD; Mary Hill RN

## 2018-11-15 NOTE — PROGRESS NOTES
Problem: Depressed Mood (Adult/Pediatric) Goal: *STG: Attends activities and groups Outcome: Progressing Towards Goal 
Patient is sitting quietly in the dinning room. Alert, calm and cooperative. Greeted staff with a smile. No distress noted. Will continue to monitor.

## 2018-11-15 NOTE — PROGRESS NOTES
Problem: Depressed Mood (Adult/Pediatric) Goal: *STG: Participates in treatment plan Outcome: Progressing Towards Goal 
Pt was in the dayroom at the start of shift watching TV and interacting well with others at the start of shift. Pt has flat affect. Pt calm and cooperative. Pt was asking for a shave at the start of shift. Pt has been compliant with meal and meds. Pt's wife came during visitation. Pt attended reflection group.

## 2018-11-15 NOTE — BH NOTES
GROUP THERAPY PROGRESS NOTE Gila Flor is participating in Positive thoughts. Group time: 15 minutes Personal goal for participation: Pt pos of the day was getting the chance to talk to someone about what is going on in his life, trying to figure out discharge plan and resources in area. Goal orientation: relaxation Group therapy participation: active Therapeutic interventions reviewed and discussed: Rules of the unit, and positive of the day. Impression of participation: Pt was calm and responded appropriately during discussion with peers and staff.

## 2018-11-15 NOTE — BH NOTES
PSYCHIATRIC PROGRESS NOTE Patient Name  Lovette Lefort Date of Birth 1965 [facility] [id number] Medical Record Number  [id number] Age  46 y.o. PCP Gerson Wood MD  
Admit date:  11/13/2018 Room Number  729/01  @ [facility]  
Date of Service  11/15/2018 E & M PROGRESS NOTE: 
  
 
 
HISTORY  
   
REASON FOR HOSPITALIZATION: 
CC: Pt admitted  voluntary basis for depression. As per pt\" I felt like dying\". HISTORY OF PRESENT ILLNESS:   
The patient, Lovette Lefort, is a 46 y.o. WHITE OR  male with 2 y.o. male with past medical history significant for arthritis, hypothyroidism, HTN, anxiety, and Timbo's disease who presents with difficulty sleeping, having no feeling, feelin empty, withdrawal, lack of moitivation, decreased appetite, depressed mood, anxiety. Pt reports he felt like dying. Pt reports he was tapered off of methadone( which he was prescribed for back pain) and stopped taking Klonopin ~1 month ago. Since then, Pt has been having increased anxiety, feels that he \"has no feelings\", he feels helpless, and finds no waqas in activities. Pt reports he got Nalterxone implant in OCT this yr. As per ER note:  Pt states he would \"just rather be dead\", but denies having a plan. Per Pt's wife, Pt's sx have been worse for ~1 week. Per wife, Pt was evaluated at the [facility] and was recommended ED evaluation. Pt deneid auditory/visual hallucinations, deneid OCD manic symptoms, deneid substance use currently. , no drinking alcohol. Past [facility] history:  6 year ago admitted at [address] [place] [address] for depression, no suicidala ttempts, h/o being started on cumbalta, lamictal, but pt never took it more than few days Lamictal, doensot take, clonidine 0.1 mg hs 
  
11/15/18: Pt reports feels dead inside, lack of motivation, depressed mood, very anxious and depressed Lovette Lefort  currently denied suicidal/homicidal ideations and plans. Pt denied auditory and visual hallucinations, Pt is compliant with the meds, no side effects to meds reported. SIDE EFFECTS: (reviewed/updated 11/15/2018) None reported or admitted to. No noted toxicity with use of epakote/Tegretol/lithium/Clozaril/TCAs ALLERGIES:(reviewed/updated 11/15/2018) No Known Allergies MEDICATIONS PRIOR TO ADMISSION:(reviewed/updated 11/15/2018) Medications Prior to Admission Medication Sig  lamoTRIgine (LAMICTAL) 25 mg tablet Take 75 mg by mouth daily.  QUEtiapine (SEROQUEL) 50 mg tablet Take 50 mg by mouth nightly.  cloNIDine HCl (CATAPRES) 0.1 mg tablet Take 0.1 mg by mouth nightly. Zully Shelley Naltrexone implant in abdomen - placed per patient at THE [facility] end of Sept  
  
PAST MEDICAL HISTORY: Past medical history from the initial psychiatric evaluation has been reviewed (reviewed/updated 11/15/2018) with no additional updates (I asked patient and no additional past medical history provided). Past Medical History:  
Diagnosis Date  Arthritis  Endocrine disease Hypothyroid  Hypertension  Ill-defined condition Timbo's Disease  Ill-defined condition Anxiety Past Surgical History:  
Procedure Laterality Date  HX ORTHOPAEDIC Knee  HX ORTHOPAEDIC Hip SOCIAL HISTORY: Social history from the initial psychiatric evaluation has been reviewed (reviewed/updated 11/15/2018) with no additional updates (I asked patient and no additional social history provided). Social History Socioeconomic History  Marital status:  Spouse name: Not on file  Number of children: Not on file  Years of education: Not on file  Highest education level: Not on file Social Needs  Financial resource strain: Not on file  Food insecurity - worry: Not on file  Food insecurity - inability: Not on file  Transportation needs - medical: Not on file  Transportation needs - non-medical: Not on file Occupational History  Not on file Tobacco Use  Smoking status: Never Smoker  Smokeless tobacco: Never Used Substance and Sexual Activity  Alcohol use: No  
  Alcohol/week: 0.0 oz  Drug use: No  
 Sexual activity: Not on file Other Topics Concern  Not on file Social History Narrative  Not on file FAMILY HISTORY: Family history from the initial psychiatric evaluation has been reviewed (reviewed/updated 11/15/2018) with no additional updates (I asked patient and no additional family history provided). History reviewed. No pertinent family history. REVIEW OF SYSTEMS: (reviewed/updated 11/15/2018) Appetite:improved Sleep: not very good All other Review of Systems: Negative except [place] MENTAL STATUS EXAM & VITALS  
  
MENTAL STATUS EXAM (MSE): MSE FINDINGS ARE WITHIN NORMAL LIMITS (WNL) UNLESS OTHERWISE STATED BELOW. ( ALL OF THE BELOW CATEGORIES OF THE MSE HAVE BEEN REVIEWED (reviewed 11/14/2018) AND UPDATED AS DEEMED APPROPRIATE ) General Presentation age appropriate, cooperative Orientation oriented to time, place and person Vital Signs  See below (reviewed 11/14/2018); Vital Signs (BP, Pulse, & Temp) are within normal limits if not listed below. Gait and Station Stable/steady, no ataxia Musculoskeletal System No extrapyramidal symptoms (EPS); no abnormal muscular movements or Tardive Dyskinesia (TD); muscle strength and tone are within normal limits Language No aphasia or dysarthria Speech:  normal pitch and normal volume Thought Processes logical; normal rate of thoughts; fair abstract reasoning/computation Thought Associations goal directed Thought Content free of delusions Suicidal Ideations no plan  and no intention Homicidal Ideations no plan  and no intention Mood:  anxious Affect:  constricted Memory recent  intact Memory remote:  intact Concentration/Attention:  distractable Fund of Knowledge average Insight:  fair Reliability good VITALS:    
Patient Vitals for the past 24 hrs: 
 Temp Pulse Resp BP SpO2  
11/15/18 1213 98.1 °F (36.7 °C) 89 16 172/84 96 % 11/15/18 0808 98.5 °F (36.9 °C) 92 16 134/85 97 % 11/14/18 2009 98.1 °F (36.7 °C) 86 18 143/83 96 % 11/14/18 1610 97.7 °F (36.5 °C) 84 18 (!) 154/91 98 % Wt Readings from Last 3 Encounters:  
11/13/18 95.3 kg (210 lb) 04/20/18 89.8 kg (197 lb 16 oz) 04/20/18 89.8 kg (198 lb) Temp Readings from Last 3 Encounters:  
11/15/18 98.1 °F (36.7 °C)  
04/20/18 97.8 °F (36.6 °C)  
04/19/18 98.4 °F (36.9 °C) BP Readings from Last 3 Encounters:  
11/15/18 172/84  
04/20/18 (!) 155/95  
04/20/18 (!) 130/98 Pulse Readings from Last 3 Encounters:  
11/15/18 89  
04/20/18 78  
04/20/18 88 DATA LABORATORY DATA:(reviewed/updated 11/15/2018) Recent Results (from the past 24 hour(s)) TSH 3RD GENERATION Collection Time: 11/15/18  5:45 AM  
Result Value Ref Range TSH 1.78 0.36 - 3.74 uIU/mL No results found for: VALF2, VALAC, VALP, VALPR, DS6, CRBAM, CRBAMP, CARB2, XCRBAM 
No results found for: LITHM  
RADIOLOGY REPORTS:(reviewed/updated 11/15/2018) No results found. MEDICATIONS ALL MEDICATIONS:  
Current Facility-Administered Medications Medication Dose Route Frequency  [START ON 11/16/2018] DULoxetine (CYMBALTA) capsule 30 mg  30 mg Oral DAILY  hydrOXYzine HCl (ATARAX) tablet 25 mg  25 mg Oral TID PRN  
 ziprasidone (GEODON) 20 mg in sterile water (preservative free) 1 mL injection  20 mg IntraMUSCular BID PRN  
 OLANZapine (ZyPREXA) tablet 5 mg  5 mg Oral Q6H PRN  
 benztropine (COGENTIN) tablet 2 mg  2 mg Oral BID PRN  
 benztropine (COGENTIN) injection 2 mg  2 mg IntraMUSCular BID PRN  
 LORazepam (ATIVAN) injection 2 mg  2 mg IntraMUSCular Q4H PRN  
 LORazepam (ATIVAN) tablet 1 mg  1 mg Oral Q4H PRN  
  zolpidem (AMBIEN) tablet 10 mg  10 mg Oral QHS PRN  
 acetaminophen (TYLENOL) tablet 650 mg  650 mg Oral Q4H PRN  
 ibuprofen (MOTRIN) tablet 400 mg  400 mg Oral Q8H PRN  
 magnesium hydroxide (MILK OF MAGNESIA) 400 mg/5 mL oral suspension 30 mL  30 mL Oral DAILY PRN  
 nicotine (NICODERM CQ) 21 mg/24 hr patch 1 Patch  1 Patch TransDERmal DAILY PRN  
  
SCHEDULED MEDICATIONS:  
Current Facility-Administered Medications Medication Dose Route Frequency  [START ON 11/16/2018] DULoxetine (CYMBALTA) capsule 30 mg  30 mg Oral DAILY ASSESSMENT & PLAN Dhe patient, Tish Ward, is a 46 y.o.  male who presents at this time for treatment of the following diagnoses: 
Patient Active Hospital Problem List: 
 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 
 
11/15/18: increasing cymbalta 30 mg daily, indiv/milue, recommended to attend groups. TSH is WNL, consider PHP I will continue to monitor blood levels (Depakote, Tegretol, lithium, clozapine---a drug with a narrow therapeutic index= NTI) and associated labs for drug therapy implemented that require intense monitoring for toxicity as deemed appropriate based on current medication side effects and pharmacodynamically determined drug 1/2 lives. In summary, Tish Ward, is a 46 y.o.  male who presents with a severe exacerbation of the principal diagnosis of Depression Patient's condition is orsening/not improving/not stable improving. Patient requires continued inpatient hospitalization for further stabilization, safety monitoring and medication management.   I will continue to coordinate the provision of individual, milieu, occupational, group, and substance abuse therapies to address target symptoms/diagnoses as deemed appropriate for the individual patient. A coordinated, multidisplinary treatment team round was conducted with the patient (this team consists of the nurse, psychiatric unit pharmcist,  and writer). Complete current electronic health record for patient has been reviewed today including consultant notes, ancillary staff notes, nurses and psychiatric tech notes. Suicide risk assessment completed and patient deemed to be of low risk for suicide at this time. The following regarding medications was addressed during rounds with patient:  
the risks and benefits of the proposed medication. The patient was given the opportunity to ask questions. Informed consent given to the use of the above medications. Will continue to adjust psychiatric and non-psychiatric medications (see above \"medication\" section and orders section for details) as deemed appropriate & based upon diagnoses and response to treatment. I will continue to order blood tests/labs and diagnostic tests as deemed appropriate and review results as they become available (see orders for details and above listed lab/test results). I will order psychiatric records from previous [facility] hospitals to further elucidate the nature of patient's psychopathology and review once available. I will gather additional collateral information from riends, family and o/p treatment team to further elucidate the nature of patient's psychopathology and baselline level of psychiatric functioning.  
  
 
 
I certify that this patient's inpatient psychiatric hospital services furnished since the previous certification were, and continue to be, required for treatment that could reasonably be expected to improve the patient's condition, or for diagnostic study, and that the patient continues to need, on a daily basis, active treatment furnished directly by or requiring the supervision of inpatient psychiatric facility personnel. In addition, the hospital records show that services furnished were intensive treatment services, admission or related services, or equivalent services. EXPECTED DISCHARGE DATE/DAY:TBD  
 
DISPOSITION: Home Signed By:  
Karen Douglas MD 
11/15/2018

## 2018-11-15 NOTE — BH NOTES
PRN Medication Documentation 
@ [facility] 24 Specific patient behavior patient is anxious, like he is going to \"jump out of my skin\" feels like stomach is doing \"flip flops\" Staff interventions attempted prior to PRN being given: distraction, therapeutic listening, deep breathing PRN medication given: Atarax 25 mg PO Patient response/effectiveness of PRN medication: pending

## 2018-11-15 NOTE — BH NOTES
GROUP THERAPY PROGRESS NOTE Shelbi Penn is participating in Discharge Planning Group Group time: 45 minutes Personal goal for participation: Tools for Recovery Goal orientation: Making Decisons with Your Team 
 
Group therapy participation: active Therapeutic interventions reviewed and discussed:  
 
Impression of participation: Pt expressed feeling loss and overwhelmed with managing home life with karel mother. Pt said he has no support system and he has to take care of his elderly mother. Pt expressed to peers that he has  to accomplished much in his life and he now has regrets. Pt is unable focus on lessons learned and expressed by his peers

## 2018-11-15 NOTE — PROGRESS NOTES
Problem: Falls - Risk of 
Goal: *Absence of Falls Document Amber Brower Fall Risk and appropriate interventions in the flowsheet. Outcome: Progressing Towards Goal 
Fall Risk Interventions: 
  
  
Medication Interventions: Teach patient to arise slowly Elimination Interventions: Bed/chair exit alarm History of Falls Interventions: Door open when patient unattended Received pt in bed, appears to be asleep. NAD. Respirations even/unlabored. Will cont to monitor q15 min for safety.

## 2018-11-15 NOTE — PROGRESS NOTES
Problem: Depressed Mood (Adult/Pediatric) Goal: *STG: Participates in treatment plan Outcome: Progressing Towards Goal 
Out on unit social w peers and staff. Mood and affect bright full range and stable. Denies SI, no self harming behaviors. Demonstrates appropriate behaviors, ability to care for self, and follow unit routine and nursing direction. Daily goals is to atetnd groups. Staff focus is on d/c planning

## 2018-11-15 NOTE — PROGRESS NOTES
Problem: Discharge Planning Goal: *Discharge to safe environment Outcome: Progressing Towards Goal 
Patient identifies home as a safe environment. Patient will return home upon discharge. Goal: *Knowledge of medication management Outcome: Progressing Towards Goal 
Patient is taking medications as prescribed. Goal: *Knowledge of discharge instructions Outcome: Progressing Towards Goal 
Patient reports understanding goals for treatment and discharge.

## 2018-11-15 NOTE — BH NOTES
PRN Medication Documentation 
@ 1120 Specific patient behavior that led to need for PRN medication: lower back pain Staff interventions attempted prior to PRN being given: distraction, breathing exercises PRN medication given: acetaminophen 650 mg PO Patient response/effectiveness of PRN medication: pending

## 2018-11-15 NOTE — BH NOTES
GROUP THERAPY PROGRESS NOTE Rayna Garcia participated in a Process Group on the General Unit with a focus identifying feelings, planning for the day, and learning about using the [address] as a long-term personal treatment plan. . 
 Group time: 75 minutes. Personal goal for participation: To increase the capacity to improve ones mood, set personal goals, and understand more about basic activities to successfully state and get ones needs met through personal treatment goal setting. Goal orientation: The patients will be able to identify their feelings and develop a goal for themselves for  
their day. The didactic portion of the session covered developing a personal short-term and long-term  
treatment plan for oneself. The group members were asked to consider filling in on their own after group. Below are the elements of a DBT house drawing with multiple levels:   
1) foundation - values that govern your life;  
2) first floor with a door  behaviors would like to manage and feel more control over or areas you want to change; the door represents an opportunity to list or draw things that you keep hidden from others;  
3) second floor  list or draw emotions you want to experience more often, more fully, or in a more healthy fashion;  
4) third floor  a list of things that make you happy or want to feel happy about;  
5) attic  list or draw what  a Life [place] Red would look like. There is also a roof, where people and things that protect you can be listed. The chimney provides an opportunity to list ways in which you blow off steam. The billboard allows one to post those things in one's life they are proud of and the walls of the house provide an opportunity to list those people and things that provide support. Group therapy participation: With prompting, this patient actively participated in the group. Therapeutic interventions reviewed and discussed:  The group members were asked to  
identify an emotion they are having and/or let the group know what they want to focus on for the  
day as they continue to make discharge plans. The group were informed of the elements of the  
[address] for them to complete in their free time. Impression of participation: The patient said he was still feeling \"anxious. ..and numb. \" He was alert, generally oriented, and somatically focused on his medication - he was encouraged to discuss his medication concerns with his treatment team and attending psychiatrist. He also began speaking some more about his family of origin and his inability to feel. He reported, \"I can't feel love. Abel Quiet Abel Quiet I haven't with my wife since when we got . Abel Quiet Abel Quiet I didn't grow up feeling love in my family. ..my sister said she feels the same way. \" [The patient mentioned in a previous group that his mother and sister both suffered from \"manic depression. \"] Taken at face value, it raises a question about his numbness, whether it is a symptom of his depression, in addition to his anxiety, and/or a form of alexithymia - not being able to identify feelings. It was suggested that his lack of love might have caused him to develop a callus around his emotional messages, making it difficult for him to perceive and/or interpret his emotions. The patient expressed no current SI/HI, even though he has sought help from various physicians to heal his \"numbness. \" He displayed no overt psychotic symptoms in this group and did not look like he was responding to internal stimuli. His affect was restricted and his mood reflected his affect. He was encouraged to keep a record of any pleasurable feeling that he has during the course of his day and to share in group tomorrow.

## 2018-11-15 NOTE — BH NOTES
GROUP THERAPY PROGRESS NOTE Shana Ferrell is participating in Discharge Planning Group Group time: 45 minutes Personal goal for participation: Readiness for discharge Goal orientation: Making The Most Of Your Tx Team Meetings Group therapy participation: active Therapeutic interventions reviewed and discussed:  
 
Impression of participation: Pt expressed feedings of distrust l with psychiatrists  and their prescribing him the correct medications. Pt participated in a role playing exercise with US playing the role of his psychiatrist. He was asked series by [address] [facility] and he discovered that he was not able to be open and honest with his doctor due to feelings of being overwhelmed. US thanked pt was his participation and openness  has helped others in the group. Pt's agreed taking over his care ( stopped taking meds) w/o involving his psych was not helpful and s/s of anxiety returned.

## 2018-11-16 VITALS
TEMPERATURE: 98.4 F | BODY MASS INDEX: 30.06 KG/M2 | HEART RATE: 94 BPM | OXYGEN SATURATION: 97 % | SYSTOLIC BLOOD PRESSURE: 126 MMHG | HEIGHT: 70 IN | RESPIRATION RATE: 16 BRPM | DIASTOLIC BLOOD PRESSURE: 75 MMHG | WEIGHT: 210 LBS

## 2018-11-16 PROCEDURE — 74011250637 HC RX REV CODE- 250/637

## 2018-11-16 PROCEDURE — 74011250637 HC RX REV CODE- 250/637: Performed by: PSYCHIATRY & NEUROLOGY

## 2018-11-16 RX ORDER — DULOXETIN HYDROCHLORIDE 30 MG/1
30 CAPSULE, DELAYED RELEASE ORAL DAILY
Qty: 30 CAP | Refills: 0 | Status: SHIPPED | OUTPATIENT
Start: 2018-11-17

## 2018-11-16 RX ORDER — HYDROXYZINE 25 MG/1
25 TABLET, FILM COATED ORAL
Qty: 30 TAB | Refills: 0 | Status: SHIPPED | OUTPATIENT
Start: 2018-11-16 | End: 2018-11-26

## 2018-11-16 RX ADMIN — DULOXETINE 30 MG: 30 CAPSULE, DELAYED RELEASE ORAL at 08:11

## 2018-11-16 RX ADMIN — IBUPROFEN 400 MG: 400 TABLET, FILM COATED ORAL at 08:54

## 2018-11-16 NOTE — INTERDISCIPLINARY ROUNDS
Behavioral Health Interdisciplinary Rounds Patient Name: Humaira Rico  Age: 46 y.o. Room/Bed:  729/ Primary Diagnosis: Depression Admission Status: Voluntary Readmission within 30 days: no 
Power of  in place: no 
Patient requires a blocked bed: no          Reason for blocked bed: VTE Prophylaxis: No 
 
Mobility needs/Fall risk: no 
Flu Vaccine : no  
Nutritional Plan: no 
Consults:         
Labs/Testing due today?: no 
 
Sleep hours:  8 Participation in Care/Groups:  yes Medication Compliant?: Yes PRNS (last 24 hours): None Restraints (last 24 hours):  no 
  
CIWA (range last 24 hours): COWS (range last 24 hours): Alcohol screening (AUDIT) completed -   AUDIT Score: 2 If applicable, date SBIRT discussed in treatment team AND documented:  
 
Tobacco - patient is a smoker: Have You Used Tobacco in the Past 30 Days: No 
Illegal Drugs use: Have You Used Any Illegal Substances Over the Past 12 Months: No 
 
24 hour chart check complete: yes Patient goal(s) for today: Discharge Treatment team focus/goals: Discharge Progress note: Patient is stable and ready for discharge LOS:  3  Expected LOS: 3 Financial concerns/prescription coverage: [employer] Date of last family contact: 11/16 RN spoke to wife Family requesting physician contact today: No 
Discharge plan: Return home Guns in the home: No    
Outpatient provider(s): [facility] Participating treatment team members: VINCE [place]; Dr. Donal Faustin MD; Sarita Carrion, RN

## 2018-11-16 NOTE — PROGRESS NOTES
Problem: Depressed Mood (Adult/Pediatric) Goal: *STG: Demonstrates reduction in symptoms and increase in insight into coping skills/future focused Outcome: Progressing Towards Goal 
Received pt in bed resting. Pt appears to be in no distress. Respirations even and unlabored. Continuing to monitor pt with q15 min safety rounds.

## 2018-11-16 NOTE — BH NOTES
2132: 
 
PRN Medication Documentation Specific patient behavior that led to need for PRN medication: patient requested this medication to help with sleep. Staff interventions attempted prior to PRN being given: decreased stimuli,rest 
PRN medication given: Ambien 10 mg per MD orders Patient response/effectiveness of PRN medication: pending upon follow up reassessment 2210: 
  
Patient is sleeping @ this time.

## 2018-11-16 NOTE — PROGRESS NOTES
Problem: Depressed Mood (Adult/Pediatric) Goal: *STG: Participates in treatment plan Outcome: Progressing Towards Goal 
Out on unit social w peers and staff. Mood and affect bright smiling and engaged. Denies SI, No no self harming behaviors. Daily goal is to d/c home. Demonstrates appropriate behaviors, ability to follow nursing direction and unit routine. Staff focus is on d/c planning and follow up care.

## 2018-11-16 NOTE — DISCHARGE SUMMARY
PSYCHIATRIC DISCHARGE SUMMARY         IDENTIFICATION:    Patient Name  Ja White   Date of Birth 1965   [facility] [id number]   Medical Record Number  [id number]      Age  46 y.o.    PCP Markell Archibald MD   Admit date:  11/13/2018    Discharge date: 11/16/2018   Room Number  729/01  @ [facility]   Date of Service  11/16/2018            TYPE OF DISCHARGE: *REGULAR               CONDITION AT DISCHARGE: stable       PROVISIONAL & DISCHARGE DIAGNOSES:    Problem List  Date Reviewed: 4/20/2018          Codes Class    * (Principal) Depression ICD-10-CM: F32.9  ICD-9-CM: 945         Chest pain with high risk of acute coronary syndrome ICD-10-CM: R07.9  ICD-9-CM: 786.50         Unstable angina (HCC) ICD-10-CM: I20.0  ICD-9-CM: 411.1         Family history of ischemic heart disease ICD-10-CM: Z82.49  ICD-9-CM: V17.3         CAD (coronary artery disease), native coronary artery ICD-10-CM: I25.10  ICD-9-CM: 414.01     Overview Signed 4/20/2018  5:01 PM by Gordo Aguila MD     Mild, nonobstructive by cardiac catheterization 4/20/2018             Myocardial bridge ICD-10-CM: Q24.5  ICD-9-CM: 746.85     Overview Signed 4/20/2018  5:02 PM by Gordo Aguila MD     0% narrowing in  diastole, 60% narrowing in systole on cardiac cath 4/20/2018             Rapid palpitations ICD-10-CM: R00.2  ICD-9-CM: 785.1         Dyslipidemia (high LDL; low HDL) ICD-10-CM: E78.5  ICD-9-CM: 272.4         Chronic back pain ICD-10-CM: M54.9, G89.29  ICD-9-CM: 724.5, 338.29         DJD (degenerative joint disease) ICD-10-CM: M19.90  ICD-9-CM: 715.90         Hypertension, essential, benign ICD-10-CM: I10  ICD-9-CM: 401.1         Adjustment disorder with anxiety ICD-10-CM: F43.22  ICD-9-CM: 309.24         History of nephrolithiasis--s/p lithotripsy ICD-10-CM: Z87.442  ICD-9-CM: V13.01         Dizziness ICD-10-CM: R42  ICD-9-CM: 780.4               Active Hospital Problems    *Depression        DISCHARGE DIAGNOSIS:   Axis I: SEE ABOVE  Axis II: SEE ABOVE  Axis III: SEE ABOVE  Axis IV:  , unemployedlack of structure  Axis V:  35 on admission, 55 on discharge 65(baseline)       CC & HISTORY OF PRESENT ILLNESS:  REASON FOR HOSPITALIZATION:  CC: Pt admitted  voluntary basis for depression. As per pt\" I felt like dying\". HISTORY OF PRESENT ILLNESS:    The patient, Nenita Hardin, is a 46 y.o. WHITE [place]  male with 2 y.o. male with past medical history significant for arthritis, hypothyroidism, HTN, anxiety, and Timbo's disease who presents with difficulty sleeping, having no feeling, feelin empty, withdrawal, lack of moitivation, decreased appetite, depressed mood, anxiety. Pt reports he felt like dying. Pt reports he was tapered off of methadone( which he was prescribed for back pain) and stopped taking Klonopin ~1 month ago. Since then, Pt has been having increased anxiety, feels that he \"has no feelings\", he feels helpless, and finds no waqas in activities. Pt reports he got Nalterxone implant in OCT this yr. As per ER note:  Pt states he would \"just rather be dead\", but denies having a plan. Per Pt's wife, Pt's sx have been worse for ~1 week. Per wife, [facility] and was recommended ED evaluation. Pt deneid auditory/visual hallucinations, deneid OCD manic symptoms, deneid substance use currently. , no drinking alcohol. Past [facility] history:  6 year ago admitted at [address] for depression, no suicidala ttempts, h/o being started on cumbalta, lamictal, but pt never took it more than few days  Lamictal, doensot take, clonidine 0.1 mg hs     11/15/18: Pt reports feels dead inside, lack of motivation, depressed mood, very anxious and depressed   Nenita Hardin  currently denied suicidal/homicidal ideations and plans. Pt denied auditory and visual hallucinations, Pt is compliant with the meds, no side effects to meds reported.   11/16/18: Pt is better, going the groups, verbal, expressing himself   Tish Ward  currently denied suicidal/homicidal ideations and plans. Pt denied auditory and visual hallucinations, Pt is compliant with the meds, no side effects to meds reported. Wife was contacted, she will pick him. [facility] [place] [address] [facility] as per  instructions. SOCIAL HISTORY:    Social History     Socioeconomic History    Marital status:      Spouse name: Not on file    Number of children: Not on file    Years of education: Not on file    Highest education level: Not on file   Social Needs    Financial resource strain: Not on file    Food insecurity - worry: Not on file    Food insecurity - inability: Not on file   [employer] needs - medical: Not on file   [employer] needs - non-medical: Not on file   Occupational History    Not on file   Tobacco Use    Smoking status: Never Smoker    Smokeless tobacco: Never Used   Substance and Sexual Activity    Alcohol use: No     Alcohol/week: 0.0 oz    Drug use: No    Sexual activity: Not on file   Other Topics Concern    Not on file   Social History Narrative    Not on file      FAMILY HISTORY:   History reviewed. No pertinent family history. HOSPITALIZATION COURSE:    Tish Ward was admitted to the inpatient psychiatric unit [facility] for acute psychiatric stabilization in regards to symptomatology as described in the HPI above. The differential diagnosis at time of admission included:MDD moderate. While on the unit Tish Ward was involved in individual, group, occupational and milieu therapy. Psychiatric medications were adjusted during this hospitalization including . Tish Ward demonstrated a low, but progressive improvement in overall condition. Much of patient's depression appeared to be related to situational stressors, effects of drugs of abuse, and psychological factors.   Please see individual progress notes for more specific details regarding patient's hospitalization course. At time of discharge, Dannie Cox is without significant problems of epression psychosis  jyoti. Patient free of suicidal and homicidal ideations (appears to be at very low risk of suicide or homicide) and reports many positive predictive factors in terms of not attempting suicide or homicide. Overall presentation at time of discharge is most consistent with the diagnosis MDD modertae. tient with request for discharge today. There are no grounds to seek a TDO. atient has maximized benefit to be derived from acute inpatient psychiatric treatment. All members of the treatment team concur with each other in regards to plans for discharge today per patient's request.  Patient and family are ware and in agreement with discharge and discharge plan. Per my last note: 11/16/18: Pt is better, going the groups, verbal, expressing himself   Dannie Cox  currently denied suicidal/homicidal ideations and plans. Pt denied auditory and visual hallucinations, Pt is compliant with the meds, no side effects to meds reported. Wife was contacted, she will pick him. [facility] [place] doctors as per  instructions.     Pt received prescription for cymbalra 30 mg for 30 days and vistaril 25 mg po prn 30 tabs         LABS AND IMAGAING:    Labs Reviewed   CBC WITH AUTOMATED DIFF - Abnormal; Notable for the following components:       Result Value    IMMATURE GRANULOCYTES 1 (*)     All other components within normal limits   METABOLIC PANEL, COMPREHENSIVE - Abnormal; Notable for the following components:    BUN/Creatinine ratio 11 (*)     A-G Ratio 1.0 (*)     All other components within normal limits   DRUG SCREEN, URINE - Abnormal; Notable for the following components:    BENZODIAZEPINES POSITIVE (*)     All other components within normal limits   SALICYLATE - Abnormal; Notable for the following components:    Salicylate level <9.2 (*)     All other components within normal limits   ACETAMINOPHEN - Abnormal; Notable for the following components:    Acetaminophen level <2 (*)     All other components within normal limits   ETHYL ALCOHOL   SAMPLES BEING HELD   TSH 3RD GENERATION     No results found for: DS35, PHEN, PHENO, PHENT, DILF, DS39, PHENY, PTN, VALF2, VALAC, VALP, VALPR, DS6, CRBAM, CRBAMP, CARB2, XCRBAM  Admission on 11/13/2018   Component Date Value Ref Range Status    WBC 11/13/2018 8.3  4.1 - 11.1 K/uL Final    RBC 11/13/2018 5.20  4. 10 - 5.70 M/uL Final    HGB 11/13/2018 15.8  12.1 - 17.0 g/dL Final    HCT 11/13/2018 47.6  36.6 - 50.3 % Final    MCV 11/13/2018 91.5  80.0 - 99.0 FL Final    MCH 11/13/2018 30.4  26.0 - 34.0 PG Final    MCHC 11/13/2018 33.2  30.0 - 36.5 g/dL Final    RDW 11/13/2018 13.3  11.5 - 14.5 % Final    PLATELET 06/84/8336 892  150 - 400 K/uL Final    MPV 11/13/2018 10.0  8.9 - 12.9 FL Final    NRBC 11/13/2018 0.0  0  WBC Final    ABSOLUTE NRBC 11/13/2018 0.00  0.00 - 0.01 K/uL Final    NEUTROPHILS 11/13/2018 75  32 - 75 % Final    LYMPHOCYTES 11/13/2018 16  12 - 49 % Final    MONOCYTES 11/13/2018 8  5 - 13 % Final    EOSINOPHILS 11/13/2018 0  0 - 7 % Final    BASOPHILS 11/13/2018 1  0 - 1 % Final    IMMATURE GRANULOCYTES 11/13/2018 1* 0.0 - 0.5 % Final    ABS. NEUTROPHILS 11/13/2018 6.2  1.8 - 8.0 K/UL Final    ABS. LYMPHOCYTES 11/13/2018 1.3  0.8 - 3.5 K/UL Final    ABS. MONOCYTES 11/13/2018 0.7  0.0 - 1.0 K/UL Final    ABS. EOSINOPHILS 11/13/2018 0.0  0.0 - 0.4 K/UL Final    ABS. BASOPHILS 11/13/2018 0.0  0.0 - 0.1 K/UL Final    ABS. IMM. GRANS.  11/13/2018 0.0  0.00 - 0.04 K/UL Final    DF 11/13/2018 AUTOMATED    Final    Sodium 11/13/2018 140  136 - 145 mmol/L Final    Potassium 11/13/2018 4.1  3.5 - 5.1 mmol/L Final    Chloride 11/13/2018 107  97 - 108 mmol/L Final    CO2 11/13/2018 25  21 - 32 mmol/L Final    Anion gap 11/13/2018 8  5 - 15 mmol/L Final    Glucose 11/13/2018 95  65 - 100 mg/dL Final    BUN 11/13/2018 13  6 - 20 MG/DL Final    Creatinine 11/13/2018 1.14  0.70 - 1.30 MG/DL Final    BUN/Creatinine ratio 11/13/2018 11* 12 - 20   Final    GFR est AA 11/13/2018 >60  >60 ml/min/1.73m2 Final    GFR est non-AA 11/13/2018 >60  >60 ml/min/1.73m2 Final    Calcium 11/13/2018 9.1  8.5 - 10.1 MG/DL Final    Bilirubin, total 11/13/2018 0.5  0.2 - 1.0 MG/DL Final    ALT (SGPT) 11/13/2018 39  12 - 78 U/L Final    AST (SGOT) 11/13/2018 20  15 - 37 U/L Final    Alk.  phosphatase 11/13/2018 78  45 - 117 U/L Final    Protein, total 11/13/2018 7.8  6.4 - 8.2 g/dL Final    Albumin 11/13/2018 3.9  3.5 - 5.0 g/dL Final    Globulin 11/13/2018 3.9  2.0 - 4.0 g/dL Final    A-G Ratio 11/13/2018 1.0* 1.1 - 2.2   Final    ALCOHOL(ETHYL),SERUM 11/13/2018 <10  <10 MG/DL Final    AMPHETAMINES 11/13/2018 NEGATIVE   NEG   Final    BARBITURATES 11/13/2018 NEGATIVE   NEG   Final    BENZODIAZEPINES 11/13/2018 POSITIVE* NEG   Final    COCAINE 11/13/2018 NEGATIVE   NEG   Final    METHADONE 11/13/2018 NEGATIVE   NEG   Final    OPIATES 11/13/2018 NEGATIVE   NEG   Final    PCP(PHENCYCLIDINE) 11/13/2018 NEGATIVE   NEG   Final    THC (TH-CANNABINOL) 11/13/2018 NEGATIVE   NEG   Final    Drug screen comment 11/13/2018 (NOTE)   Final    Ventricular Rate 11/13/2018 77  BPM Final    Atrial Rate 11/13/2018 77  BPM Final    P-R Interval 11/13/2018 136  ms Final    QRS Duration 11/13/2018 82  ms Final    Q-T Interval 11/13/2018 370  ms Final    QTC Calculation (Bezet) 11/13/2018 418  ms Final    Calculated P Axis 11/13/2018 26  degrees Final    Calculated R Axis 11/13/2018 25  degrees Final    Calculated T Axis 11/13/2018 9  degrees Final    Diagnosis 11/13/2018    Final                    Value:Normal sinus rhythm  When compared with ECG of 20-APR-2018 14:17,  No significant change was found  Confirmed by Agustin Leiva M.D., [place] ([id number]) on 11/14/2018 10:11:39 AM      SAMPLES BEING HELD 11/13/2018 [address] [place] [address]    COMMENT 11/13/2018 Add-on orders for these samples will be processed based on acceptable specimen integrity and analyte stability, which may vary by analyte. Final    Salicylate level 57/29/7304 <1.7* 2.8 - 20.0 MG/DL Final    Acetaminophen level 11/13/2018 <2* 10 - 30 ug/mL Final    TSH 11/15/2018 1.78  0.36 - 3.74 uIU/mL Final     No results found. DISPOSITION:    Home. Patient to f/u with drug/etoh rehabilitation, psychiatric, and psychotherapy appointments. Patient is to f/u with internist as directed. Patient should have a depakote level and associated labs checked within the next 1-2 weeks by patient's o/p psychiatrist/internist.               FOLLOW-UP CARE:    Activity as tolerated  Regular Diet  Wound Care: none needed. Follow-up Information     Follow up With Specialties Details Why [address] [facility]  On 11/19/2018 You have an 8:00am intake appointment for the partial hospitalization program. Please remember to bring your photo ID and insurance card. Plan to stay until 3:30pm. [place] [phone]  (To the left of the Emergency Department)  [facility]  [phone]                 PROGNOSIS:   Good / Guarded --- based on nature of patient's pathology/ies and treatment compliance issues. Prognosis is greatly dependent upon patient's ability to remain sober and to follow up with drug/etoh rehabilitation and sychiatric/psychotherapy appointments as well as to comply with psychiatric medications as prescribed. DISCHARGE MEDICATIONS: (no changes made). Informed consent given for the use of following psychotropic medications:  Current Discharge Medication List                 A coordinated, multidisplinary treatment team round was conducted with Roger Novak is done daily here at [facility]. This team consists of the nurse, psychiatric unit pharmcist,  and writer. I have spent greater than 35 minutes on discharge work.     Signed:  Hiram Terry MD  11/16/2018

## 2018-11-16 NOTE — BH NOTES
GROUP THERAPY PROGRESS NOTE Azalea Henson participated in a Process Group with a focus identifying feelings, planning for the rest of the day, and reviewing DBT skills for managing emotional distress. Group time: 75 minutes. Personal goal for participation: To increase the capacity to improve ones mood, structure, and coping capacity. Goal orientation: The patient will be able to identify their feelings, develop a plan for structuring their day, and begin to appreciate the possibility of successfully managing distress and other forms of intense emotions. Group therapy participation: With prompting, this patient participated in the group. Therapeutic interventions reviewed and discussed: The group members were asked to introduce themselves to each other and to see if they could identify an emotion they are having and/or let the group know what they want to focus on for the day as they continue to make discharge plans. The group members also reviewed five suggested areas of DBT options when experiencing intense emotions: distraction, improve the moment, self-soothe, pros and cons, and radical acceptance. They were asked to take turns reading from the handout and discussing its contents. At the end of group the patients were provided a summary of the topics covered. Impression of participation: The patient said he was feeling \"anxious\" and that he was leaving the hospital later today. He added that he planned to go to a two-week outpatient or partial hospitalization program. He also talked about having racing thoughts, poor sensations in his hand, poor sleep, etc. He was alert and generally oriented. He expressed no current SI/HI but added that he was not sure he would have [place] more waqas in my life. \" He seemed to be somatically focused and displayed no A/V hallucinations.  His affect remained anxious and slightly depressed, neither as intently as on admission. His mood reflected his affect. The patient was feeling ambivalent about his up-coming discharge but also admitted that he had gotten something positive from his stay.

## 2018-11-16 NOTE — BH NOTES
Behavioral Health Transition Record to Provider Patient Name: Hilary Sher YOB: 1965 Medical Record Number: [id number] Date of Admission: 11/13/2018 Date of Discharge: 11/16/2018 Attending Provider: Shalini Stokes MD 
Discharging Provider: Shalini Stokes MD 
To contact this individual call [phone] and ask the  to page. If unavailable, ask to be transferred to [address] [place] [address] on call. [facility] Provider will be available on call 24/7 and during holidays. Primary Care Provider: Kary Conley MD 
 
No Known Allergies Reason for Admission: Pt admitted  voluntary basis for depression. As per pt\" I felt like dying\". Admission Diagnosis: Depression * No surgery found * Results for orders placed or performed during the hospital encounter of 11/13/18 CBC WITH AUTOMATED DIFF Result Value Ref Range WBC 8.3 4.1 - 11.1 K/uL  
 RBC 5.20 4. 10 - 5.70 M/uL  
 HGB 15.8 12.1 - 17.0 g/dL HCT 47.6 36.6 - 50.3 % MCV 91.5 80.0 - 99.0 FL  
 MCH 30.4 26.0 - 34.0 PG  
 MCHC 33.2 30.0 - 36.5 g/dL  
 RDW 13.3 11.5 - 14.5 % PLATELET 730 198 - 327 K/uL MPV 10.0 8.9 - 12.9 FL  
 NRBC 0.0 0  WBC ABSOLUTE NRBC 0.00 0.00 - 0.01 K/uL NEUTROPHILS 75 32 - 75 % LYMPHOCYTES 16 12 - 49 % MONOCYTES 8 5 - 13 % EOSINOPHILS 0 0 - 7 % BASOPHILS 1 0 - 1 % IMMATURE GRANULOCYTES 1 (H) 0.0 - 0.5 % ABS. NEUTROPHILS 6.2 1.8 - 8.0 K/UL  
 ABS. LYMPHOCYTES 1.3 0.8 - 3.5 K/UL  
 ABS. MONOCYTES 0.7 0.0 - 1.0 K/UL  
 ABS. EOSINOPHILS 0.0 0.0 - 0.4 K/UL  
 ABS. BASOPHILS 0.0 0.0 - 0.1 K/UL  
 ABS. IMM. GRANS. 0.0 0.00 - 0.04 K/UL  
 DF AUTOMATED METABOLIC PANEL, COMPREHENSIVE Result Value Ref Range Sodium 140 136 - 145 mmol/L Potassium 4.1 3.5 - 5.1 mmol/L Chloride 107 97 - 108 mmol/L  
 CO2 25 21 - 32 mmol/L Anion gap 8 5 - 15 mmol/L Glucose 95 65 - 100 mg/dL  BUN 13 6 - 20 MG/DL  
 Creatinine 1.14 0.70 - 1.30 MG/DL  
 BUN/Creatinine ratio 11 (L) 12 - 20 GFR est AA >60 >60 ml/min/1.73m2 GFR est non-AA >60 >60 ml/min/1.73m2 Calcium 9.1 8.5 - 10.1 MG/DL Bilirubin, total 0.5 0.2 - 1.0 MG/DL  
 ALT (SGPT) 39 12 - 78 U/L  
 AST (SGOT) 20 15 - 37 U/L Alk. phosphatase 78 45 - 117 U/L Protein, total 7.8 6.4 - 8.2 g/dL Albumin 3.9 3.5 - 5.0 g/dL Globulin 3.9 2.0 - 4.0 g/dL A-G Ratio 1.0 (L) 1.1 - 2.2 ETHYL ALCOHOL Result Value Ref Range ALCOHOL(ETHYL),SERUM <10 <10 MG/DL  
DRUG SCREEN, URINE Result Value Ref Range AMPHETAMINES NEGATIVE  NEG    
 BARBITURATES NEGATIVE  NEG BENZODIAZEPINES POSITIVE (A) NEG    
 COCAINE NEGATIVE  NEG METHADONE NEGATIVE  NEG    
 OPIATES NEGATIVE  NEG    
 PCP(PHENCYCLIDINE) NEGATIVE  NEG    
 THC (TH-CANNABINOL) NEGATIVE  NEG Drug screen comment (NOTE) SAMPLES BEING HELD Result Value Ref Range SAMPLES BEING HELD LATONYA Roberson COMMENT Add-on orders for these samples will be processed based on acceptable specimen integrity and analyte stability, which may vary by analyte. SALICYLATE Result Value Ref Range Salicylate level <2.4 (L) 2.8 - 20.0 MG/DL  
ACETAMINOPHEN Result Value Ref Range Acetaminophen level <2 (L) 10 - 30 ug/mL TSH 3RD GENERATION Result Value Ref Range TSH 1.78 0.36 - 3.74 uIU/mL EKG, 12 LEAD, INITIAL Result Value Ref Range Ventricular Rate 77 BPM  
 Atrial Rate 77 BPM  
 P-R Interval 136 ms QRS Duration 82 ms Q-T Interval 370 ms QTC Calculation (Bezet) 418 ms Calculated P Axis 26 degrees Calculated R Axis 25 degrees Calculated T Axis 9 degrees Diagnosis Normal sinus rhythm When compared with ECG of 20-APR-2018 14:17, No significant change was found Confirmed by Dudley Vee M.D., Ferd Collet ([id number]) on 11/14/2018 10:11:39 AM 
  
 
 
Immunizations administered during this encounter: There is no immunization history on file for this patient. Screening for Metabolic Disorders for Patients on Antipsychotic Medications 
(Data obtained from the EMR) Estimated Body Mass Index Estimated body mass index is 30.13 kg/m² as calculated from the following: 
  Height as of this encounter: 5' 10\" (1.778 m). Weight as of this encounter: 95.3 kg (210 lb). Vital Signs/Blood Pressure Visit Vitals /75 Pulse 94 Temp 98.4 °F (36.9 °C) Resp 16 Ht 5' 10\" (1.778 m) Wt 95.3 kg (210 lb) SpO2 97% BMI 30.13 kg/m² Blood Glucose/Hemoglobin A1c Lab Results Component Value Date/Time Glucose 95 2018 01:44 PM  
 
No results found for: HBA1C, HGBE8, VHU1DSVO Lipid Panel Lab Results Component Value Date/Time Cholesterol, total 197 2016 09:35 AM  
 HDL Cholesterol 35 (L) 2016 09:35 AM  
 LDL, calculated 121 (H) 2016 09:35 AM  
 Triglyceride 203 (H) 2016 09:35 AM  
 
  
Discharge Diagnosis: Depression (ICD-10-CM: F32.9) Discharge Plan: Patient discharged home into the care of family. DISCHARGE SUMMARYNAME:Ghassan Juarez [facility] : 1965 MRN: [id number] The patient Char Rider exhibits the ability to control behavior in a less restrictive environment. Patient's level of functioning is improving. No assaultive/destructive behavior has been observed for the past 24 hours. No suicidal/homicidal threat or behavior has been observed for the past 24 hours. There is no evidence of serious medication side effects. Patient has not been in physical or protective restraints for at least the past 24 hours. If weapons involved, how are they secured? No weapons involved. Is patient aware of and in agreement with discharge plan? Yes Arrangements for medication:  Prescriptions given to patient. Copy of discharge instructions to provider?:  [facility] ([phone]); Dr. Daryle Sober Arrangements for transportation home:  Wife to . Keep all follow up appointments as scheduled, continue to take prescribed medications per physician instructions. Mental health crisis number:  304 or your local mental health crisis line number at [phone]. Discharge Medication List and Instructions:  
Current Discharge Medication List  
  
START taking these medications Details DULoxetine (CYMBALTA) 30 mg capsule Take 1 Cap by mouth daily. Qty: 30 Cap, Refills: 0  
  
hydrOXYzine HCl (ATARAX) 25 mg tablet Take 1 Tab by mouth four (4) times daily as needed for Itching (anxiety) for up to 10 days. Qty: 30 Tab, Refills: 0 STOP taking these medications  
  
 lamoTRIgine (LAMICTAL) 25 mg tablet Comments:  
Reason for Stopping: QUEtiapine (SEROQUEL) 50 mg tablet Comments:  
Reason for Stopping:   
   
 cloNIDine HCl (CATAPRES) 0.1 mg tablet Comments:  
Reason for Stopping: OTHER,NON-FORMULARY, Comments:  
Reason for Stopping:   
   
  
 
 
Unresulted Labs (24h ago, onward) None To obtain results of studies pending at discharge, please contact [phone] Follow-up Information Follow up With Specialties Details Why Contact Info [facility]  On 11/19/2018 You have an 8:00am intake appointment for the partial hospitalization program. Please remember to bring your photo ID and insurance card. Plan to stay until 3:30pm. [facility] 
[id number] [facility] [address] (To the left of the Emergency Department) [facility] 
[phone] Rupa Viera MD [address] [facility] [id number] 
[phone] Advanced Directive:  
Does the patient have an appointed surrogate decision maker? No 
Does the patient have a Medical Advance Directive? No 
Does the patient have a Psychiatric Advance Directive?  No 
If the patient does not have a surrogate or Medical Advance Directive AND Psychiatric Advance Directive, the patient was offered information on these advance directives Yes and Patient declined to complete Patient Instructions: Please continue all medications until otherwise directed by physician. Tobacco Cessation Discharge Plan:  
Is the patient a smoker and needs referral for smoking cessation? No 
Patient referred to the following for smoking cessation with an appointment? Not applicable Patient was offered medication to assist with smoking cessation at discharge? Not applicable Was education for smoking cessation added to the discharge instructions? Yes Alcohol/Substance Abuse Discharge Plan:  
Does the patient have a history of substance/alcohol abuse and requires a referral for treatment? No 
Patient referred to the following for substance/alcohol abuse treatment with an appointment? Not applicable Patient was offered medication to assist with alcohol cessation at discharge? Not applicable Was education for substance/alcohol abuse added to discharge instructions? No 
 
Patient discharged to Home; discussed with patient/caregiver and provided to the patient/caregiver either in hard copy or electronically.

## 2018-11-16 NOTE — DISCHARGE INSTRUCTIONS
DISCHARGE SUMMARY    NAME:Ghassan Walton  : 1965  MRN: [id number]    The patient Reed Gerber exhibits the ability to control behavior in a less restrictive environment. Patient's level of functioning is improving. No assaultive/destructive behavior has been observed for the past 24 hours. No suicidal/homicidal threat or behavior has been observed for the past 24 hours. There is no evidence of serious medication side effects. Patient has not been in physical or protective restraints for at least the past 24 hours. If weapons involved, how are they secured? No weapons involved. Is patient aware of and in agreement with discharge plan? Yes    Arrangements for medication:  Prescriptions given to patient. Copy of discharge instructions to provider?:  [facility] ([phone]); Dr. Sobia Fleming for transportation home:  Wife to . Keep all follow up appointments as scheduled, continue to take prescribed medications per physician instructions. Mental health crisis number:  652 or your local mental health crisis line number at [phone]. DISCHARGE SUMMARY from Nurse    PATIENT INSTRUCTIONS:    What to do at Home:  Recommended activity: Activity as tolerated,     If you experience any of the following symptoms, thoughts of harming self, feeling overwhelmed with anxiety, hopelessness and loneliness  , please follow up with your staff at [facility]. If symptoms continue and you can not reach staff immediately call your local crisis number at [phone]    *  Please give a list of your current medications to your Primary Care Provider. *  Please update this list whenever your medications are discontinued, doses are      changed, or new medications (including over-the-counter products) are added. *  Please carry medication information at all times in case of emergency situations.     These are general instructions for a healthy lifestyle:    No smoking/ No tobacco products/ Avoid exposure to second hand smoke  Surgeon General's Warning:  Quitting smoking now greatly reduces serious risk to your health. Obesity, smoking, and sedentary lifestyle greatly increases your risk for illness    A healthy diet, regular physical exercise & weight monitoring are important for maintaining a healthy lifestyle    You may be retaining fluid if you have a history of heart failure or if you experience any of the following symptoms:  Weight gain of 3 pounds or more overnight or 5 pounds in a week, increased swelling in our hands or feet or shortness of breath while lying flat in bed. Please call your doctor as soon as you notice any of these symptoms; do not wait until your next office visit. Recognize signs and symptoms of STROKE:    F-face looks uneven    A-arms unable to move or move unevenly    S-speech slurred or non-existent    T-time-call 911 as soon as signs and symptoms begin-DO NOT go       Back to bed or wait to see if you get better-TIME IS BRAIN. Warning Signs of HEART ATTACK     Call 911 if you have these symptoms:   Chest discomfort. Most heart attacks involve discomfort in the center of the chest that lasts more than a few minutes, or that goes away and comes back. It can feel like uncomfortable pressure, squeezing, fullness, or pain.  Discomfort in other areas of the upper body. Symptoms can include pain or discomfort in one or both arms, the back, neck, jaw, or stomach.  Shortness of breath with or without chest discomfort.  Other signs may include breaking out in a cold sweat, nausea, or lightheadedness. Don't wait more than five minutes to call 911 - MINUTES MATTER! Fast action can save your life. Calling 911 is almost always the fastest way to get lifesaving treatment.  Emergency Medical Services staff can begin treatment when they arrive -- up to an hour sooner than if someone gets to the hospital by car. The discharge information has been reviewed with the patient. The patient verbalized understanding. Discharge medications reviewed with the patient and appropriate educational materials and side effects teaching were provided.   ___________________________________________________________________________________________________________________________________

## 2018-11-16 NOTE — BH NOTES
PSYCHIATRIC PROGRESS NOTE Patient Name  Zane Parry Date of Birth 1965 [facility] [id number] Medical Record Number  [id number] Age  46 y.o. PCP Simon Woodward MD  
Admit date:  11/13/2018 Room Number  729/01  @ [facility]  
Date of Service  11/16/2018 E & M PROGRESS NOTE: 
  
 
 
HISTORY  
   
REASON FOR HOSPITALIZATION: 
CC: Pt admitted  voluntary basis for depression. As per pt\" I felt like dying\". HISTORY OF PRESENT ILLNESS:   
The patient, Zane Parry, is a 46 y.o. WHITE [place]  male with 2 y.o. male with past medical history significant for arthritis, hypothyroidism, HTN, anxiety, and Timbo's disease who presents with difficulty sleeping, having no feeling, feelin empty, withdrawal, lack of moitivation, decreased appetite, depressed mood, anxiety. Pt reports he felt like dying. Pt reports he was tapered off of methadone( which he was prescribed for back pain) and stopped taking Klonopin ~1 month ago. Since then, Pt has been having increased anxiety, feels that he \"has no feelings\", he feels helpless, and finds no waqas in activities. Pt reports he got Nalterxone implant in OCT this yr. As per ER note:  Pt states he would \"just rather be dead\", but denies having a plan. Per Pt's wife, Pt's sx have been worse for ~1 week. Per wife, Pt was evaluated at the [facility] and was recommended ED evaluation. Pt deneid auditory/visual hallucinations, deneid OCD manic symptoms, deneid substance use currently. , no drinking alcohol. Past [facility] history:  6 year ago admitted at [address] [place] [address] for depression, no suicidala ttempts, h/o being started on cumbalta, lamictal, but pt never took it more than few days Lamictal, doensot take, clonidine 0.1 mg hs 
  
11/15/18: Pt reports feels dead inside, lack of motivation, depressed mood, very anxious and depressed Zane Parry  currently denied suicidal/homicidal ideations and plans. Pt denied auditory and visual hallucinations, Pt is compliant with the meds, no side effects to meds reported. 11/16/18: Pt is better, going the groups, verbal, expressing himself Humaira Rico  currently denied suicidal/homicidal ideations and plans. Pt denied auditory and visual hallucinations, Pt is compliant with the meds, no side effects to meds reported. Wife was contacted, she will pick him. Pt will [facility]u at [facility] doctors as per  instructions. SIDE EFFECTS: (reviewed/updated 11/16/2018) None reported or admitted to. No noted toxicity with use of epakote/Tegretol/lithium/Clozaril/TCAs ALLERGIES:(reviewed/updated 11/16/2018) No Known Allergies MEDICATIONS PRIOR TO ADMISSION:(reviewed/updated 11/16/2018) Medications Prior to Admission Medication Sig  lamoTRIgine (LAMICTAL) 25 mg tablet Take 75 mg by mouth daily.  QUEtiapine (SEROQUEL) 50 mg tablet Take 50 mg by mouth nightly.  cloNIDine HCl (CATAPRES) 0.1 mg tablet Take 0.1 mg by mouth nightly. Lorri Sheridan, Naltrexone implant in abdomen - placed per patient at THE [facility] end of Sept  
  
PAST MEDICAL HISTORY: Past medical history from the initial psychiatric evaluation has been reviewed (reviewed/updated 11/16/2018) with no additional updates (I asked patient and no additional past medical history provided). Past Medical History:  
Diagnosis Date  Arthritis  Endocrine disease Hypothyroid  Hypertension  Ill-defined condition Timbo's Disease  Ill-defined condition Anxiety Past Surgical History:  
Procedure Laterality Date  HX ORTHOPAEDIC Knee  HX ORTHOPAEDIC Hip SOCIAL HISTORY: Social history from the initial psychiatric evaluation has been reviewed (reviewed/updated 11/16/2018) with no additional updates (I asked patient and no additional social history provided). Social History Socioeconomic History  Marital status:   
 Spouse name: Not on file  Number of children: Not on file  Years of education: Not on file  Highest education level: Not on file Social Needs  Financial resource strain: Not on file  Food insecurity - worry: Not on file  Food insecurity - inability: Not on file  Transportation needs - medical: Not on file  Transportation needs - non-medical: Not on file Occupational History  Not on file Tobacco Use  Smoking status: Never Smoker  Smokeless tobacco: Never Used Substance and Sexual Activity  Alcohol use: No  
  Alcohol/week: 0.0 oz  Drug use: No  
 Sexual activity: Not on file Other Topics Concern  Not on file Social History Narrative  Not on file FAMILY HISTORY: Family history from the initial psychiatric evaluation has been reviewed (reviewed/updated 11/16/2018) with no additional updates (I asked patient and no additional family history provided). History reviewed. No pertinent family history. REVIEW OF SYSTEMS: (reviewed/updated 11/16/2018) Appetite:improved Sleep: not very good All other Review of Systems: Negative except [place] MENTAL STATUS EXAM & VITALS  
  
MENTAL STATUS EXAM (MSE): MSE FINDINGS ARE WITHIN NORMAL LIMITS (WNL) UNLESS OTHERWISE STATED BELOW. ( ALL OF THE BELOW CATEGORIES OF THE MSE HAVE BEEN REVIEWED (reviewed 11/14/2018) AND UPDATED AS DEEMED APPROPRIATE ) General Presentation age appropriate, cooperative Orientation oriented to time, place and person Vital Signs  See below (reviewed 11/14/2018); Vital Signs (BP, Pulse, & Temp) are within normal limits if not listed below. Gait and Station Stable/steady, no ataxia Musculoskeletal System No extrapyramidal symptoms (EPS); no abnormal muscular movements or Tardive Dyskinesia (TD); muscle strength and tone are within normal limits Language No aphasia or dysarthria Speech:  normal pitch and normal volume Thought Processes logical; normal rate of thoughts; fair abstract reasoning/computation Thought Associations goal directed Thought Content free of delusions Suicidal Ideations no plan  and no intention Homicidal Ideations no plan  and no intention Mood:  euthymic Affect:  brighter Memory recent  intact Memory remote:  intact Concentration/Attention:  distractable Fund of Knowledge average Insight:  fair Reliability good VITALS:    
Patient Vitals for the past 24 hrs: 
 Temp Pulse Resp BP SpO2  
11/16/18 0745 98.4 °F (36.9 °C) 94 16 126/75   
11/15/18 1908 98.1 °F (36.7 °C) 90 18 130/82 97 % 11/15/18 1542 98.4 °F (36.9 °C) 98 16 138/86 95 % 11/15/18 1213 98.1 °F (36.7 °C) 89 16 172/84 96 % Wt Readings from Last 3 Encounters:  
11/13/18 95.3 kg (210 lb) 04/20/18 89.8 kg (197 lb 16 oz) 04/20/18 89.8 kg (198 lb) Temp Readings from Last 3 Encounters:  
11/16/18 98.4 °F (36.9 °C)  
04/20/18 97.8 °F (36.6 °C)  
04/19/18 98.4 °F (36.9 °C) BP Readings from Last 3 Encounters:  
11/16/18 126/75  
04/20/18 (!) 155/95  
04/20/18 (!) 130/98 Pulse Readings from Last 3 Encounters:  
11/16/18 94  
04/20/18 78  
04/20/18 88 DATA LABORATORY DATA:(reviewed/updated 11/16/2018) No results found for this or any previous visit (from the past 24 hour(s)). No results found for: VALF2, VALAC, VALP, VALPR, DS6, CRBAM, CRBAMP, CARB2, XCRBAM 
No results found for: LITHM  
RADIOLOGY REPORTS:(reviewed/updated 11/16/2018) No results found. MEDICATIONS ALL MEDICATIONS:  
Current Facility-Administered Medications Medication Dose Route Frequency  DULoxetine (CYMBALTA) capsule 30 mg  30 mg Oral DAILY  hydrOXYzine HCl (ATARAX) tablet 25 mg  25 mg Oral QID PRN  
 ziprasidone (GEODON) 20 mg in sterile water (preservative free) 1 mL injection  20 mg IntraMUSCular BID PRN  
 OLANZapine (ZyPREXA) tablet 5 mg  5 mg Oral Q6H PRN  
  benztropine (COGENTIN) tablet 2 mg  2 mg Oral BID PRN  
 benztropine (COGENTIN) injection 2 mg  2 mg IntraMUSCular BID PRN  
 LORazepam (ATIVAN) injection 2 mg  2 mg IntraMUSCular Q4H PRN  
 zolpidem (AMBIEN) tablet 10 mg  10 mg Oral QHS PRN  
 acetaminophen (TYLENOL) tablet 650 mg  650 mg Oral Q4H PRN  
 ibuprofen (MOTRIN) tablet 400 mg  400 mg Oral Q8H PRN  
 magnesium hydroxide (MILK OF MAGNESIA) 400 mg/5 mL oral suspension 30 mL  30 mL Oral DAILY PRN  
 nicotine (NICODERM CQ) 21 mg/24 hr patch 1 Patch  1 Patch TransDERmal DAILY PRN  
  
SCHEDULED MEDICATIONS:  
Current Facility-Administered Medications Medication Dose Route Frequency  DULoxetine (CYMBALTA) capsule 30 mg  30 mg Oral DAILY ASSESSMENT & PLAN Dhe patient, Benny Eden, is a 46 y.o.  male who presents at this time for treatment of the following diagnoses: 
Patient Active Hospital Problem List: 
 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 
 
11/15/18: increasing cymbalta 30 mg daily, indiv/milue, recommended to attend groups. TSH is WNL, consider PHP 
11/16/18: continue meds, Pt f/u at [address] as per  instructions, pt psychoeducated. I will continue to monitor blood levels (Depakote, Tegretol, lithium, clozapine---a drug with a narrow therapeutic index= NTI) and associated labs for drug therapy implemented that require intense monitoring for toxicity as deemed appropriate based on current medication side effects and pharmacodynamically determined drug 1/2 lives. In summary, Benny Eden, is a 46 y.o.  male who presents with a severe exacerbation of the principal diagnosis of Depression Patient's condition is orsening/not improving/not stable improving.   Patient requires continued inpatient hospitalization for further stabilization, safety monitoring and medication management. I will continue to coordinate the provision of individual, milieu, occupational, group, and substance abuse therapies to address target symptoms/diagnoses as deemed appropriate for the individual patient. A coordinated, multidisplinary treatment team round was conducted with the patient (this team consists of the nurse, psychiatric unit pharmcist,  and writer). Complete current electronic health record for patient has been reviewed today including consultant notes, ancillary staff notes, nurses and psychiatric tech notes. Suicide risk assessment completed and patient deemed to be of low risk for suicide at this time. The following regarding medications was addressed during rounds with patient:  
the risks and benefits of the proposed medication. The patient was given the opportunity to ask questions. Informed consent given to the use of the above medications. Will continue to adjust psychiatric and non-psychiatric medications (see above \"medication\" section and orders section for details) as deemed appropriate & based upon diagnoses and response to treatment. I will continue to order blood tests/labs and diagnostic tests as deemed appropriate and review results as they become available (see orders for details and above listed lab/test results). I will order psychiatric records from previous [facility] hospitals to further elucidate the nature of patient's psychopathology and review once available. I will gather additional collateral information from riends, family and o/p treatment team to further elucidate the nature of patient's psychopathology and baselline level of psychiatric functioning.  
  
 
 
I certify that this patient's inpatient psychiatric hospital services furnished since the previous certification were, and continue to be, required for treatment that could reasonably be expected to improve the patient's condition, or for diagnostic study, and that the patient continues to need, on a daily basis, active treatment furnished directly by or requiring the supervision of inpatient psychiatric facility personnel. In addition, the hospital records show that services furnished were intensive treatment services, admission or related services, or equivalent services. EXPECTED DISCHARGE DATE/DAY:discharge DISPOSITION: Home Signed By:  
Shayla Marsh MD 
11/16/2018

## 2019-02-06 ENCOUNTER — OFFICE VISIT (OUTPATIENT)
Dept: CARDIOLOGY CLINIC | Age: 54
End: 2019-02-06

## 2019-02-06 VITALS
OXYGEN SATURATION: 96 % | DIASTOLIC BLOOD PRESSURE: 86 MMHG | SYSTOLIC BLOOD PRESSURE: 140 MMHG | HEART RATE: 88 BPM | WEIGHT: 224.6 LBS | HEIGHT: 70 IN | RESPIRATION RATE: 18 BRPM | BODY MASS INDEX: 32.16 KG/M2

## 2019-02-06 DIAGNOSIS — R00.2 RAPID PALPITATIONS: Primary | ICD-10-CM

## 2019-02-06 DIAGNOSIS — I25.10 NON-OCCLUSIVE CORONARY ARTERY DISEASE: ICD-10-CM

## 2019-02-06 DIAGNOSIS — E78.5 DYSLIPIDEMIA (HIGH LDL; LOW HDL): ICD-10-CM

## 2019-02-06 DIAGNOSIS — Q24.5 MYOCARDIAL BRIDGE: ICD-10-CM

## 2019-02-06 DIAGNOSIS — I10 HYPERTENSION, ESSENTIAL, BENIGN: ICD-10-CM

## 2019-02-06 DIAGNOSIS — I25.10 ASHD (ARTERIOSCLEROTIC HEART DISEASE): ICD-10-CM

## 2019-02-06 RX ORDER — CHOLECALCIFEROL (VITAMIN D3) 125 MCG
CAPSULE ORAL DAILY
COMMUNITY

## 2019-02-06 RX ORDER — AMLODIPINE BESYLATE 5 MG/1
5 TABLET ORAL DAILY
COMMUNITY
Start: 2019-01-24 | End: 2019-03-20

## 2019-02-06 NOTE — PROGRESS NOTES
1. Have you been to the ER, urgent care clinic since your last visit? Hospitalized since your last visit? No    2. Have you seen or consulted any other health care providers outside of the [address] [place] [address] since your last visit? Include any pap smears or colon screening. No    Chief Complaint   Patient presents with    Coronary Artery Disease     Yearly appt. C/O SOB with exertion, c/O palps.

## 2019-02-07 NOTE — PROGRESS NOTES
[address] [place] [address]        [phone]                             NEW PATIENT HPI/FOLLOW-UP    NAME:  Jean Paul Faulkner   :   1965   MRN:   [id number]   PCP:  Mina Bowman MD           Subjective: The patient is a 48y.o. year old male  who returns for a routine follow-up for symptomatic increasingly frequent intermittent rapid irregular palpitations over last 6 months or so. Occur mostly at night. Has Fitbit watch and notes rapid up and down irregularly to fast heart rates during the day when active. Denies change in exercise tolerance, chest pain, edema, medication intolerance, shortness of breath, PND/orthopnea wheezing, sputum, syncope, dizziness or light headedness. Doing satisfactorily. Avoids caffeine. Review of Systems  General: Pt denies excessive weight gain or loss. Pt is able to conduct ADL's. Respiratory: Denies shortness of breath, MANDUJANO, wheezing or stridor.   Cardiovascular: Denies precordial pain,++ palpitations, edema or PND  Gastrointestinal: Denies poor appetite, indigestion, abdominal pain or blood in stool  Peripheral vascular: Denies claudication, leg cramps  Neurological: Denies paresthesias, tingling.numbness  Psychiatric: Denies anxiety,depression,fatigue  Musculoskeletal: Denies pain,tenderness, soreness,swelling      Past Medical History:   Diagnosis Date    Arthritis     Endocrine disease     Hypothyroid    Hypertension     Ill-defined condition     Timbo's Disease    Ill-defined condition     Anxiety     Patient Active Problem List    Diagnosis Date Noted    Depression 2018    Chest pain with high risk of acute coronary syndrome 2018    Unstable angina ([address] 2018    Family history of ischemic heart disease 2018    CAD (coronary artery disease), native coronary artery 2018    Myocardial bridge 2018    Rapid palpitations 2016    Dyslipidemia (high LDL; low HDL) 06/27/2014    Chronic back pain 06/27/2014    DJD (degenerative joint disease) 06/27/2014    Hypertension, essential, benign 06/27/2014    Adjustment disorder with anxiety 06/27/2014    History of nephrolithiasis--s/p lithotripsy 06/27/2014    Dizziness 06/27/2014      Past Surgical History:   Procedure Laterality Date    HX ORTHOPAEDIC      Knee    HX ORTHOPAEDIC      Hip     No Known Allergies   No family history on file. Social History     Socioeconomic History    Marital status:      Spouse name: Not on file    Number of children: Not on file    Years of education: Not on file    Highest education level: Not on file   Social Needs    Financial resource strain: Not on file    Food insecurity - worry: Not on file    Food insecurity - inability: Not on file    Transportation needs - medical: Not on file   [employer] needs - non-medical: Not on file   Occupational History    Not on file   Tobacco Use    Smoking status: Never Smoker    Smokeless tobacco: Never Used   Substance and Sexual Activity    Alcohol use: No     Alcohol/week: 0.0 oz    Drug use: No    Sexual activity: Not on file   Other Topics Concern    Not on file   Social History Narrative    Not on file      Current Outpatient Medications   Medication Sig    cholecalciferol, vitamin D3, (VITAMIN D3) 2,000 unit tab Take  by mouth daily.  amLODIPine (NORVASC) 5 mg tablet Take 5 mg by mouth daily.  DULoxetine (CYMBALTA) 30 mg capsule Take 1 Cap by mouth daily. No current facility-administered medications for this visit. I have reviewed the nurses notes, vitals, problem list, allergy list, medical history, family medical, social history and medications. Objective:     Physical Exam:     Vitals:    02/06/19 1506 02/06/19 1512   BP: 140/86 140/86   Pulse: 88    Resp: 18    SpO2: 96%    Weight: 224 lb 9.6 oz (101.9 kg)    Height: 5' 10\" (1.778 m)     Body mass index is 32.23 kg/m².     General: Well developed, in no acute distress. HEENT: No carotid bruits, no JVD, trach is midline. Heart:  Normal S1/S2 negative S3 or S4. Regular, no murmur, gallop or rub.   Respiratory: Clear bilaterally, no wheezing or rales  Abdomen:   Soft, non-tender, bowel sounds are active.   Extremities:  No edema, normal cap refill, no cyanosis. Neuro: A&Ox3, speech clear, gait stable. Skin: Skin color is normal. No rashes or lesions. No diaphoresis. Vascular: 2+ pulses symmetric in all extremities        Data Review:       Cardiographics:    EKG: NSR,one PVC,WNL    SUMMARY:CARDIAC CATH 4/20/18    --  CARDIAC STRUCTURES:  --  Global left ventricular function was normal. EF calculated by contrast  ventriculography was 65 %. --  CORONARY CIRCULATION:  --  Proximal RCA: There was a 30 % stenosis.     Cardiology Labs:    Results for orders placed or performed during the hospital encounter of 11/13/18   EKG, 12 LEAD, INITIAL   Result Value Ref Range    Ventricular Rate 77 BPM    Atrial Rate 77 BPM    P-R Interval 136 ms    QRS Duration 82 ms    Q-T Interval 370 ms    QTC Calculation (Bezet) 418 ms    Calculated P Axis 26 degrees    Calculated R Axis 25 degrees    Calculated T Axis 9 degrees    Diagnosis       Normal sinus rhythm  When compared with ECG of 20-APR-2018 14:17,  No significant change was found  Confirmed by Kristine German M.D., [place] ([id number]) on 11/14/2018 10:11:39 AM         Lab Results   Component Value Date/Time    Cholesterol, total 197 06/29/2016 09:35 AM    HDL Cholesterol 35 (L) 06/29/2016 09:35 AM    LDL, calculated 121 (H) 06/29/2016 09:35 AM    Triglyceride 203 (H) 06/29/2016 09:35 AM       Lab Results   Component Value Date/Time    Sodium 140 11/13/2018 01:44 PM    Potassium 4.1 11/13/2018 01:44 PM    Chloride 107 11/13/2018 01:44 PM    CO2 25 11/13/2018 01:44 PM    Anion gap 8 11/13/2018 01:44 PM    Glucose 95 11/13/2018 01:44 PM    BUN 13 11/13/2018 01:44 PM    Creatinine 1.14 11/13/2018 01:44 PM    BUN/Creatinine ratio 11 (L) 11/13/2018 01:44 PM    GFR est AA >60 11/13/2018 01:44 PM    GFR est non-AA >60 11/13/2018 01:44 PM    Calcium 9.1 11/13/2018 01:44 PM    Bilirubin, total 0.5 11/13/2018 01:44 PM    AST (SGOT) 20 11/13/2018 01:44 PM    Alk. phosphatase 78 11/13/2018 01:44 PM    Protein, total 7.8 11/13/2018 01:44 PM    Albumin 3.9 11/13/2018 01:44 PM    Globulin 3.9 11/13/2018 01:44 PM    A-G Ratio 1.0 (L) 11/13/2018 01:44 PM    ALT (SGPT) 39 11/13/2018 01:44 PM          Assessment:       ICD-10-CM ICD-9-CM    1. Rapid palpitations R00.2 785.1 AMB POC EKG ROUTINE W/ 12 LEADS, INTER & REP   2. ASHD (arteriosclerotic heart disease) I25.10 414.00    3. Non-occlusive coronary artery disease I25.10 414.00 amLODIPine (NORVASC) 5 mg tablet   4. Myocardial bridge-LAD to 60% Q24.5 746.85 amLODIPine (NORVASC) 5 mg tablet   5. Dyslipidemia (high LDL; low HDL) E78.5 272.4    6. Hypertension, essential, benign I10 401.1          Discussion: Patient presents at this time with increasing frequency intermittent rapid irregular palpatations now every day and especially at night over last 6 months. Will obtain 48 hr HM. Plan: 1. Continue same meds. Lipid profile and labs followed by PCP. 2.Encouraged to exercise to tolerance, lose weight and follow low fat, low cholesterol, low sodium predominantly Plant-based (consider Mediterranean) diet. Call with questions or concerns. Will follow up any test results by phone and/or f/u here in office if needed. Sonny Sal 3.Follow up: 1 month     I have discussed the diagnosis with the patient and the intended plan as seen in the above orders. The patient has received an after-visit summary and questions were answered concerning future plans. I have discussed any concerning medication side effects and warnings with the patient as well.     Sabas Cadena MD  2/6/2019

## 2019-02-19 ENCOUNTER — CLINICAL SUPPORT (OUTPATIENT)
Dept: CARDIOLOGY CLINIC | Age: 54
End: 2019-02-19

## 2019-02-19 DIAGNOSIS — R42 DIZZINESS: ICD-10-CM

## 2019-02-19 DIAGNOSIS — I49.9 IRREGULAR HEART BEAT: Primary | ICD-10-CM

## 2019-02-19 DIAGNOSIS — R00.2 RAPID PALPITATIONS: ICD-10-CM

## 2019-03-20 ENCOUNTER — OFFICE VISIT (OUTPATIENT)
Dept: CARDIOLOGY CLINIC | Age: 54
End: 2019-03-20

## 2019-03-20 VITALS
WEIGHT: 228.6 LBS | DIASTOLIC BLOOD PRESSURE: 82 MMHG | BODY MASS INDEX: 32.73 KG/M2 | HEART RATE: 88 BPM | OXYGEN SATURATION: 97 % | HEIGHT: 70 IN | RESPIRATION RATE: 18 BRPM | SYSTOLIC BLOOD PRESSURE: 130 MMHG

## 2019-03-20 DIAGNOSIS — Z87.442 HISTORY OF NEPHROLITHIASIS: ICD-10-CM

## 2019-03-20 DIAGNOSIS — Q24.5 MYOCARDIAL BRIDGE: ICD-10-CM

## 2019-03-20 DIAGNOSIS — R00.2 HEART PALPITATIONS: Primary | ICD-10-CM

## 2019-03-20 DIAGNOSIS — I10 HYPERTENSION, ESSENTIAL, BENIGN: ICD-10-CM

## 2019-03-20 DIAGNOSIS — E78.5 DYSLIPIDEMIA (HIGH LDL; LOW HDL): ICD-10-CM

## 2019-03-20 DIAGNOSIS — I25.10 NON-OCCLUSIVE CORONARY ARTERY DISEASE: ICD-10-CM

## 2019-03-20 RX ORDER — AMLODIPINE BESYLATE 5 MG/1
2.5 TABLET ORAL DAILY
Qty: 30 TAB | Refills: 3 | Status: SHIPPED | OUTPATIENT
Start: 2019-03-20 | End: 2020-06-11 | Stop reason: ALTCHOICE

## 2019-03-20 RX ORDER — METOPROLOL TARTRATE 25 MG/1
25 TABLET, FILM COATED ORAL 2 TIMES DAILY
Qty: 60 TAB | Refills: 3 | Status: SHIPPED | OUTPATIENT
Start: 2019-03-20 | End: 2019-11-27 | Stop reason: ALTCHOICE

## 2019-03-20 RX ORDER — OLANZAPINE 5 MG/1
5 TABLET ORAL
COMMUNITY
Start: 2019-02-08 | End: 2020-06-11 | Stop reason: ALTCHOICE

## 2019-03-20 NOTE — PROGRESS NOTES
1. Have you been to the ER, urgent care clinic since your last visit? Hospitalized since your last visit? NO.    2. Have you seen or consulted any other health care providers outside of the [address] [place] [address] since your last visit? Include any pap smears or colon screening. NO.         Chief Complaint   Patient presents with    Follow-up     1 MONTH-REPORTS THE PALPS ARE ABOUT THE SAME    Results     DISCUSS Liang Rosales

## 2019-03-20 NOTE — PROGRESS NOTES
[address] [place] [address]        [phone]                             NEW PATIENT HPI/FOLLOW-UP    NAME:  Gricelda Valentine   :   1965   MRN:   [id number]   PCP:  Wilmar Driver MD           Subjective: The patient is a 48y.o. year old male  who returns for a routine follow-up. Since the last visit, patient reports no new symptoms. Denies change in exercise tolerance, chest pain, edema, medication intolerance, palpitations, shortness of breath, PND/orthopnea wheezing, sputum, syncope, dizziness or light headedness. Doing satisfactorily. Review of Systems  General: Pt denies excessive weight gain or loss. Pt is able to conduct ADL's. Respiratory: Denies shortness of breath, MANDUJANO, wheezing or stridor.   Cardiovascular: Denies precordial pain, palpitations, edema or PND  Gastrointestinal: Denies poor appetite, indigestion, abdominal pain or blood in stool  Peripheral vascular: Denies claudication, leg cramps  Neurological: Denies paresthesias, tingling.numbness  Psychiatric: Denies anxiety,depression,fatigue  Musculoskeletal: Denies pain,tenderness, soreness,swelling      Past Medical History:   Diagnosis Date    Arthritis     Endocrine disease     Hypothyroid    Hypertension     Ill-defined condition     Timbo's Disease    Ill-defined condition     Anxiety     Patient Active Problem List    Diagnosis Date Noted    Depression 2018    Chest pain with high risk of acute coronary syndrome 2018    Unstable angina ([facility] [address] 2018    Family history of ischemic heart disease 2018    Non-occlusive coronary artery disease 2018    Myocardial bridge 2018    Rapid palpitations 2016    Dyslipidemia (high LDL; low HDL) 2014    Chronic back pain 2014    DJD (degenerative joint disease) 2014    Hypertension, essential, benign 2014    Adjustment disorder with anxiety 2014    History of nephrolithiasis--s/p lithotripsy 06/27/2014    Dizziness 06/27/2014      Past Surgical History:   Procedure Laterality Date    HX ORTHOPAEDIC      Knee    HX ORTHOPAEDIC      Hip     No Known Allergies   No family history on file. Social History     Socioeconomic History    Marital status:      Spouse name: Not on file    Number of children: Not on file    Years of education: Not on file    Highest education level: Not on file   Occupational History    Not on file   Social Needs    Financial resource strain: Not on file    Food insecurity:     Worry: Not on file     Inability: Not on file    Transportation needs:     Medical: Not on file     Non-medical: Not on file   Tobacco Use    Smoking status: Never Smoker    Smokeless tobacco: Never Used   Substance and Sexual Activity    Alcohol use: No     Alcohol/week: 0.0 oz    Drug use: No    Sexual activity: Not on file   Lifestyle    Physical activity:     Days per week: Not on file     Minutes per session: Not on file    Stress: Not on file   Relationships    Social connections:     Talks on phone: Not on file     Gets together: Not on file     Attends Judaism service: Not on file     Active member of club or organization: Not on file     Attends meetings of clubs or organizations: Not on file     Relationship status: Not on file    Intimate partner violence:     Fear of current or ex partner: Not on file     Emotionally abused: Not on file     Physically abused: Not on file     Forced sexual activity: Not on file   Other Topics Concern    Not on file   Social History Narrative    Not on file      Current Outpatient Medications   Medication Sig    OLANZapine (ZYPREXA) 5 mg tablet Take 5 mg by mouth nightly.  amLODIPine (NORVASC) 5 mg tablet Take 5 mg by mouth daily.  cholecalciferol, vitamin D3, (VITAMIN D3) 2,000 unit tab Take  by mouth daily.  Indications: REPORTS TAKING SOMETIMES    DULoxetine (CYMBALTA) 30 mg capsule Take 1 Cap by mouth daily. No current facility-administered medications for this visit. I have reviewed the nurses notes, vitals, problem list, allergy list, medical history, family medical, social history and medications. Objective:     Physical Exam:     Vitals:    03/20/19 1340 03/20/19 1348   BP: 134/82 130/82   Pulse: 88    Resp: 18    SpO2: 97%    Weight: 228 lb 9.6 oz (103.7 kg)    Height: 5' 10\" (1.778 m)     Body mass index is 32.8 kg/m². General: Well developed, in no acute distress. HEENT: No carotid bruits, no JVD, trach is midline. Heart:  Normal S1/S2 negative S3 or S4. Regular, no murmur, gallop or rub.   Respiratory: Clear bilaterally, no wheezing or rales  Abdomen:   Soft, non-tender, bowel sounds are active.   Extremities:  No edema, normal cap refill, no cyanosis. Neuro: A&Ox3, speech clear, gait stable. Skin: Skin color is normal. No rashes or lesions. No diaphoresis.   Vascular: 2+ pulses symmetric in all extremities        Data Review:       Cardiographics:    ECG Monitor/24 hours, Complete: 2/20/19    Reason for Holter Monitor  PALPITATIONS    Heartbeat    Slowest 50  Average 83  Fastest  128      Results:   Underlying Rhythm: Normal sinus rhythm      Atrial Arrhythmias: none            AV Conduction: normal    Ventricular Arrhythmias: premature ventricular contractions; occasional     ST Segment Analysis:normal     Symptom Correlation:  Palpitations correlate with single pvcs    Comment:   Sinus rhythm with symptomatic pvcs     Cardiology Labs:    Results for orders placed or performed during the hospital encounter of 11/13/18   EKG, 12 LEAD, INITIAL   Result Value Ref Range    Ventricular Rate 77 BPM    Atrial Rate 77 BPM    P-R Interval 136 ms    QRS Duration 82 ms    Q-T Interval 370 ms    QTC Calculation (Bezet) 418 ms    Calculated P Axis 26 degrees    Calculated R Axis 25 degrees    Calculated T Axis 9 degrees    Diagnosis       Normal sinus rhythm  When compared with ECG of 20-APR-2018 14:17,  No significant change was found  Confirmed by Kavita Corcoran M.D., Sean Pierson ([id number]) on 11/14/2018 10:11:39 AM         Lab Results   Component Value Date/Time    Cholesterol, total 197 06/29/2016 09:35 AM    HDL Cholesterol 35 (L) 06/29/2016 09:35 AM    LDL, calculated 121 (H) 06/29/2016 09:35 AM    Triglyceride 203 (H) 06/29/2016 09:35 AM       Lab Results   Component Value Date/Time    Sodium 140 11/13/2018 01:44 PM    Potassium 4.1 11/13/2018 01:44 PM    Chloride 107 11/13/2018 01:44 PM    CO2 25 11/13/2018 01:44 PM    Anion gap 8 11/13/2018 01:44 PM    Glucose 95 11/13/2018 01:44 PM    BUN 13 11/13/2018 01:44 PM    Creatinine 1.14 11/13/2018 01:44 PM    BUN/Creatinine ratio 11 (L) 11/13/2018 01:44 PM    GFR est AA >60 11/13/2018 01:44 PM    GFR est non-AA >60 11/13/2018 01:44 PM    Calcium 9.1 11/13/2018 01:44 PM    Bilirubin, total 0.5 11/13/2018 01:44 PM    AST (SGOT) 20 11/13/2018 01:44 PM    Alk. phosphatase 78 11/13/2018 01:44 PM    Protein, total 7.8 11/13/2018 01:44 PM    Albumin 3.9 11/13/2018 01:44 PM    Globulin 3.9 11/13/2018 01:44 PM    A-G Ratio 1.0 (L) 11/13/2018 01:44 PM    ALT (SGPT) 39 11/13/2018 01:44 PM          Assessment:       ICD-10-CM ICD-9-CM    1. Heart palpitations R00.2 785.1    2. Dyslipidemia (high LDL; low HDL) E78.5 272.4    3. Hypertension, essential, benign I10 401.1    4. Myocardial bridge Q24.5 746.85    5. Non-occlusive coronary artery disease I25.10 414.00 amLODIPine (NORVASC) 5 mg tablet   6. History of nephrolithiasis--s/p lithotripsy Z87.442 V13.01    7. Myocardial bridge-LAD to 60% Q24.5 746.85 amLODIPine (NORVASC) 5 mg tablet         Discussion: Patient presents at this time stable from a cardiac perspective. HM reveals occasional PVC's correlating symptoms of palpitations. Also, inappropriate ST with minimal activity and known myocardial bridging LAD. With hx of HTN, will add BB as Metoprolol 25 mg q 12 hrs and lower Amlodipine to 2.5 mg every day. Continue to keep BP/HR diary and call with response 1-2 weeks. Pleased with present status. Plan: 1. Continue same meds. Lipid profile and labs followed by PCP. 2.Encouraged to exercise to tolerance, lose weight and follow low fat, low cholesterol, low sodium predominantly Plant-based (consider Mediterranean) diet. Call with questions or concerns. Will follow up any test results by phone and/or f/u here in office if needed. Eun Emmanuel 3.Follow up: 1 mionth. I have discussed the diagnosis with the patient and the intended plan as seen in the above orders. The patient has received an after-visit summary and questions were answered concerning future plans. I have discussed any concerning medication side effects and warnings with the patient as well.     Obed Maguire MD  3/20/2019

## 2019-04-29 ENCOUNTER — OFFICE VISIT (OUTPATIENT)
Dept: CARDIOLOGY CLINIC | Age: 54
End: 2019-04-29

## 2019-04-29 VITALS
HEIGHT: 70 IN | RESPIRATION RATE: 18 BRPM | OXYGEN SATURATION: 98 % | WEIGHT: 235.6 LBS | SYSTOLIC BLOOD PRESSURE: 118 MMHG | HEART RATE: 70 BPM | DIASTOLIC BLOOD PRESSURE: 80 MMHG | BODY MASS INDEX: 33.73 KG/M2

## 2019-04-29 DIAGNOSIS — R00.2 HEART PALPITATIONS: Primary | ICD-10-CM

## 2019-04-29 DIAGNOSIS — I10 HYPERTENSION, ESSENTIAL, BENIGN: ICD-10-CM

## 2019-04-29 DIAGNOSIS — E78.5 DYSLIPIDEMIA (HIGH LDL; LOW HDL): ICD-10-CM

## 2019-04-29 DIAGNOSIS — Q24.5 MYOCARDIAL BRIDGE: ICD-10-CM

## 2019-04-29 DIAGNOSIS — I25.10 NON-OCCLUSIVE CORONARY ARTERY DISEASE: ICD-10-CM

## 2019-04-29 NOTE — PROGRESS NOTES
[address] [facility]        [phone]                             NEW PATIENT HPI/FOLLOW-UP    NAME:  India Henning   :   1965   MRN:   [id number]   PCP:  Joselito Cardoso MD           Subjective: The patient is a 48y.o. year old male  who returns for a routine follow-up. Since the last visit, patient reports no new symptoms. Denies change in exercise tolerance, chest pain, edema, medication intolerance, palpitations, shortness of breath, PND/orthopnea wheezing, sputum, syncope, dizziness or light headedness. Doing satisfactorily. Review of Systems  General: Pt denies excessive weight gain or loss. Pt is able to conduct ADL's. Respiratory: Denies shortness of breath, MANDUJANO, wheezing or stridor.   Cardiovascular: Denies precordial pain, palpitations, edema or PND  Gastrointestinal: Denies poor appetite, indigestion, abdominal pain or blood in stool  Peripheral vascular: Denies claudication, leg cramps  Neurological: Denies paresthesias, tingling.numbness  Psychiatric: Denies anxiety,depression,fatigue  Musculoskeletal: Denies pain,tenderness, soreness,swelling      Past Medical History:   Diagnosis Date    Arthritis     Endocrine disease     Hypothyroid    Hypertension     Ill-defined condition     Timbo's Disease    Ill-defined condition     Anxiety     Patient Active Problem List    Diagnosis Date Noted    Depression 2018    Chest pain with high risk of acute coronary syndrome 2018    Unstable angina ([facility] [address] 2018    Family history of ischemic heart disease 2018    Non-occlusive coronary artery disease 2018    Myocardial bridge 2018    Heart palpitations 2016    Dyslipidemia (high LDL; low HDL) 2014    Chronic back pain 2014    DJD (degenerative joint disease) 2014    Hypertension, essential, benign 2014    Adjustment disorder with anxiety 2014    History of nephrolithiasis--s/p lithotripsy 06/27/2014    Dizziness 06/27/2014      Past Surgical History:   Procedure Laterality Date    HX ORTHOPAEDIC      Knee    HX ORTHOPAEDIC      Hip     No Known Allergies   No family history on file. Social History     Socioeconomic History    Marital status:      Spouse name: Not on file    Number of children: Not on file    Years of education: Not on file    Highest education level: Not on file   Occupational History    Not on file   Social Needs    Financial resource strain: Not on file    Food insecurity:     Worry: Not on file     Inability: Not on file    Transportation needs:     Medical: Not on file     Non-medical: Not on file   Tobacco Use    Smoking status: Never Smoker    Smokeless tobacco: Never Used   Substance and Sexual Activity    Alcohol use: No     Alcohol/week: 0.0 oz    Drug use: No    Sexual activity: Not on file   Lifestyle    Physical activity:     Days per week: Not on file     Minutes per session: Not on file    Stress: Not on file   Relationships    Social connections:     Talks on phone: Not on file     Gets together: Not on file     Attends Rastafarian service: Not on file     Active member of club or organization: Not on file     Attends meetings of clubs or organizations: Not on file     Relationship status: Not on file    Intimate partner violence:     Fear of current or ex partner: Not on file     Emotionally abused: Not on file     Physically abused: Not on file     Forced sexual activity: Not on file   Other Topics Concern    Not on file   Social History Narrative    Not on file      Current Outpatient Medications   Medication Sig    metoprolol tartrate (LOPRESSOR) 25 mg tablet Take 1 Tab by mouth two (2) times a day. Indications: inappropriate sinus tachycardia    amLODIPine (NORVASC) 5 mg tablet Take 0.5 Tabs by mouth daily.  cholecalciferol, vitamin D3, (VITAMIN D3) 2,000 unit tab Take  by mouth daily.  Indications: REPORTS TAKING SOMETIMES    OLANZapine (ZYPREXA) 5 mg tablet Take 5 mg by mouth nightly.  DULoxetine (CYMBALTA) 30 mg capsule Take 1 Cap by mouth daily. No current facility-administered medications for this visit. I have reviewed the nurses notes, vitals, problem list, allergy list, medical history, family medical, social history and medications. Objective:     Physical Exam:     Vitals:    04/29/19 1041   BP: 118/80   Pulse: 70   Resp: 18   SpO2: 98%   Weight: 235 lb 9.6 oz (106.9 kg)   Height: 5' 10\" (1.778 m)    Body mass index is 33.81 kg/m². General: Well developed, in no acute distress. HEENT: No carotid bruits, no JVD, trach is midline. Heart:  Normal S1/S2 negative S3 or S4. Regular, no murmur, gallop or rub.   Respiratory: Clear bilaterally, no wheezing or rales  Abdomen:   Soft, non-tender, bowel sounds are active.   Extremities:  No edema, normal cap refill, no cyanosis. Neuro: A&Ox3, speech clear, gait stable. Skin: Skin color is normal. No rashes or lesions. No diaphoresis.   Vascular: 2+ pulses symmetric in all extremities        Data Review:       Cardiographics:    Cardiology Labs:    Results for orders placed or performed during the hospital encounter of 11/13/18   EKG, 12 LEAD, INITIAL   Result Value Ref Range    Ventricular Rate 77 BPM    Atrial Rate 77 BPM    P-R Interval 136 ms    QRS Duration 82 ms    Q-T Interval 370 ms    QTC Calculation (Bezet) 418 ms    Calculated P Axis 26 degrees    Calculated R Axis 25 degrees    Calculated T Axis 9 degrees    Diagnosis       Normal sinus rhythm  When compared with ECG of 20-APR-2018 14:17,  No significant change was found  Confirmed by Mildred Connor M.D., Francisco Tracy ([id number]) on 11/14/2018 10:11:39 AM         Lab Results   Component Value Date/Time    Cholesterol, total 197 06/29/2016 09:35 AM    HDL Cholesterol 35 (L) 06/29/2016 09:35 AM    LDL, calculated 121 (H) 06/29/2016 09:35 AM    Triglyceride 203 (H) 06/29/2016 09:35 AM       Lab Results   Component Value Date/Time    Sodium 140 11/13/2018 01:44 PM    Potassium 4.1 11/13/2018 01:44 PM    Chloride 107 11/13/2018 01:44 PM    CO2 25 11/13/2018 01:44 PM    Anion gap 8 11/13/2018 01:44 PM    Glucose 95 11/13/2018 01:44 PM    BUN 13 11/13/2018 01:44 PM    Creatinine 1.14 11/13/2018 01:44 PM    BUN/Creatinine ratio 11 (L) 11/13/2018 01:44 PM    GFR est AA >60 11/13/2018 01:44 PM    GFR est non-AA >60 11/13/2018 01:44 PM    Calcium 9.1 11/13/2018 01:44 PM    Bilirubin, total 0.5 11/13/2018 01:44 PM    AST (SGOT) 20 11/13/2018 01:44 PM    Alk. phosphatase 78 11/13/2018 01:44 PM    Protein, total 7.8 11/13/2018 01:44 PM    Albumin 3.9 11/13/2018 01:44 PM    Globulin 3.9 11/13/2018 01:44 PM    A-G Ratio 1.0 (L) 11/13/2018 01:44 PM    ALT (SGPT) 39 11/13/2018 01:44 PM          Assessment:       ICD-10-CM ICD-9-CM    1. Heart palpitations R00.2 785.1    2. Non-occlusive coronary artery disease I25.10 414.00    3. Hypertension, essential, benign I10 401.1    4. Dyslipidemia (high LDL; low HDL) E78.5 272.4    5. Myocardial bridge Q24.5 746.85          Discussion: Patient presents at this time stable from a cardiac perspective. Palpitations and BP controlled on newly started BB and Amlodipine. Pleased with present cardiac status. Plan: 1. Continue same meds. Lipid profile and labs followed by PCP. 2.Encouraged to exercise to tolerance and follow low fat, low cholesterol, low sodium predominantly Plant-based (consider Mediterranean) diet. Call with questions or concerns. Will follow up any test results by phone and/or f/u here in office if needed. Stefani Vivar 3.Follow up: 6 MONTHS    I have discussed the diagnosis with the patient and the intended plan as seen in the above orders. The patient has received an after-visit summary and questions were answered concerning future plans. I have discussed any concerning medication side effects and warnings with the patient as well.     Aide Marquis, MD  4/29/2019

## 2019-04-29 NOTE — PROGRESS NOTES
1. Have you been to the ER, urgent care clinic since your last visit? Hospitalized since your last visit? No    2. Have you seen or consulted any other health care providers outside of the [address] [place] [address] since your last visit? Include any pap smears or colon screening.  No

## 2019-11-19 ENCOUNTER — HOSPITAL ENCOUNTER (OUTPATIENT)
Dept: CT IMAGING | Age: 54
Discharge: HOME | End: 2019-11-19
Attending: UROLOGY
Payer: COMMERCIAL

## 2019-11-19 DIAGNOSIS — Z87.442 HISTORY OF KIDNEY STONES: ICD-10-CM

## 2019-11-19 PROCEDURE — 74176 CT ABD & PELVIS W/O CONTRAST: CPT

## 2019-11-25 ENCOUNTER — TELEPHONE (OUTPATIENT)
Dept: CARDIOLOGY CLINIC | Age: 54
End: 2019-11-25

## 2019-11-25 NOTE — TELEPHONE ENCOUNTER
Spoke with patient. Verified patient with two patient identifiers. Advised needs preop appt prior to cardiac clearance for ESWL under general anesthesia with Dr. Alyssa Roman on 12-2-19. (Was no show for Nov 2019 appt). Pt calling to schedule appt. Geovanni Yu LPN with Dr. Ulises Forman, ph# [phone], fax # [phone]. Faxing this note to Dr. Ulises Forman.

## 2019-11-27 ENCOUNTER — OFFICE VISIT (OUTPATIENT)
Dept: CARDIOLOGY CLINIC | Age: 54
End: 2019-11-27

## 2019-11-27 VITALS
BODY MASS INDEX: 33.44 KG/M2 | DIASTOLIC BLOOD PRESSURE: 80 MMHG | HEIGHT: 70 IN | OXYGEN SATURATION: 98 % | RESPIRATION RATE: 18 BRPM | SYSTOLIC BLOOD PRESSURE: 148 MMHG | HEART RATE: 124 BPM | WEIGHT: 233.6 LBS

## 2019-11-27 DIAGNOSIS — I25.10 ASHD (ARTERIOSCLEROTIC HEART DISEASE): ICD-10-CM

## 2019-11-27 DIAGNOSIS — Z87.442 HISTORY OF NEPHROLITHIASIS: ICD-10-CM

## 2019-11-27 DIAGNOSIS — E78.5 DYSLIPIDEMIA (HIGH LDL; LOW HDL): ICD-10-CM

## 2019-11-27 DIAGNOSIS — I25.10 NON-OCCLUSIVE CORONARY ARTERY DISEASE: ICD-10-CM

## 2019-11-27 DIAGNOSIS — F43.22 ADJUSTMENT DISORDER WITH ANXIETY: ICD-10-CM

## 2019-11-27 DIAGNOSIS — I10 HYPERTENSION, ESSENTIAL, BENIGN: ICD-10-CM

## 2019-11-27 DIAGNOSIS — R00.0 INAPPROPRIATE SINUS TACHYCARDIA: ICD-10-CM

## 2019-11-27 DIAGNOSIS — Z01.818 PREOPERATIVE CLEARANCE: Primary | ICD-10-CM

## 2019-11-27 DIAGNOSIS — Q24.5 MYOCARDIAL BRIDGE: ICD-10-CM

## 2019-11-27 RX ORDER — METOPROLOL SUCCINATE 50 MG/1
50 TABLET, EXTENDED RELEASE ORAL DAILY
Qty: 30 TAB | Refills: 3 | Status: SHIPPED | OUTPATIENT
Start: 2019-11-27 | End: 2020-04-20 | Stop reason: SDUPTHER

## 2019-11-27 RX ORDER — BISMUTH SUBSALICYLATE 262 MG
1 TABLET,CHEWABLE ORAL DAILY
COMMUNITY

## 2019-11-27 RX ORDER — UREA 10 %
1 LOTION (ML) TOPICAL
COMMUNITY

## 2019-11-27 NOTE — TELEPHONE ENCOUNTER
Verbal order per Dr. Author Joshi, pt is cleared for surgery at low cardiac risk with Dr. Izaiah Parry on 12-2-19. Faxing this note to Dr. Cheo Hart.

## 2019-11-27 NOTE — PROGRESS NOTES
1. Have you been to the ER, urgent care clinic since your last visit? Hospitalized since your last visit? No    2. Have you seen or consulted any other health care providers outside of the [address] [place] [address] since your last visit? Include any pap smears or colon screening. No     Chief Complaint   Patient presents with    Pre-op Exam     8 mo appt.,  Needs cardiac clearance for ESWL under general anesthesia with Dr. Luzma Mistry on 12-2-19. Denied cardiac symptoms.

## 2019-11-27 NOTE — PROGRESS NOTES
[address] [place] [address]        [phone]                             NEW PATIENT HPI/FOLLOW-UP    NAME:  Dannie Fajardo   :   1965   MRN:   [id number]   PCP:  Fitz Jansen MD           Subjective: The patient is a 48y.o. year old male  who returns for a routine follow-up and for cardiac clearance prior to Urologic surgery under general anesthesia. Had cardiac cath  revealing 30% RCA stenosis and LVEF 60-65%. Since the last visit, patient reports no new cardiac symptoms. Denies change in exercise tolerance, chest pain, edema, medication intolerance, palpitations, shortness of breath, PND/orthopnea wheezing, sputum, syncope, dizziness or light headedness. Doing satisfactorily. Review of Systems  General: Pt denies excessive weight gain or loss. Pt is able to conduct ADL's. Respiratory: Denies shortness of breath, MANDUJANO, wheezing or stridor.   Cardiovascular: Denies precordial pain, palpitations, edema or PND  Gastrointestinal: Denies poor appetite, indigestion, abdominal pain or blood in stool  Peripheral vascular: Denies claudication, leg cramps  Neurological: Denies paresthesias, tingling.numbness  Psychiatric: Denies anxiety,depression,fatigue  Musculoskeletal: Denies pain,tenderness, soreness,swelling      Past Medical History:   Diagnosis Date    Arthritis     Endocrine disease     Hypothyroid    Hypertension     Ill-defined condition     Timbo's Disease    Ill-defined condition     Anxiety     Patient Active Problem List    Diagnosis Date Noted    Depression 2018    Chest pain with high risk of acute coronary syndrome 2018    Unstable angina ([address] 2018    Family history of ischemic heart disease 2018    Non-occlusive coronary artery disease 2018    Myocardial bridge 2018    Heart palpitations 2016    Dyslipidemia (high LDL; low HDL) 2014    Chronic back pain 2014    DJD (degenerative joint disease) 06/27/2014    Hypertension, essential, benign 06/27/2014    Adjustment disorder with anxiety 06/27/2014    History of nephrolithiasis--s/p lithotripsy 06/27/2014    Dizziness 06/27/2014      Past Surgical History:   Procedure Laterality Date    HX ORTHOPAEDIC      Knee    HX ORTHOPAEDIC      Hip     No Known Allergies   History reviewed. No pertinent family history. Social History     Socioeconomic History    Marital status:      Spouse name: Not on file    Number of children: Not on file    Years of education: Not on file    Highest education level: Not on file   Occupational History    Not on file   Social Needs    Financial resource strain: Not on file    Food insecurity:     Worry: Not on file     Inability: Not on file    Transportation needs:     Medical: Not on file     Non-medical: Not on file   Tobacco Use    Smoking status: Never Smoker    Smokeless tobacco: Never Used   Substance and Sexual Activity    Alcohol use: No     Alcohol/week: 0.0 standard drinks    Drug use: No    Sexual activity: Not on file   Lifestyle    Physical activity:     Days per week: Not on file     Minutes per session: Not on file    Stress: Not on file   Relationships    Social connections:     Talks on phone: Not on file     Gets together: Not on file     Attends Restorationist service: Not on file     Active member of club or organization: Not on file     Attends meetings of clubs or organizations: Not on file     Relationship status: Not on file    Intimate partner violence:     Fear of current or ex partner: Not on file     Emotionally abused: Not on file     Physically abused: Not on file     Forced sexual activity: Not on file   Other Topics Concern    Not on file   Social History Narrative    Not on file      Current Outpatient Medications   Medication Sig    OLANZapine (ZYPREXA) 5 mg tablet Take 5 mg by mouth nightly.     metoprolol tartrate (LOPRESSOR) 25 mg tablet Take 1 Tab by mouth two (2) times a day. Indications: inappropriate sinus tachycardia    amLODIPine (NORVASC) 5 mg tablet Take 0.5 Tabs by mouth daily.  cholecalciferol, vitamin D3, (VITAMIN D3) 2,000 unit tab Take  by mouth daily. Indications: REPORTS TAKING SOMETIMES    DULoxetine (CYMBALTA) 30 mg capsule Take 1 Cap by mouth daily. No current facility-administered medications for this visit. I have reviewed the nurses notes, vitals, problem list, allergy list, medical history, family medical, social history and medications. Objective:     Physical Exam:     Vitals:    11/27/19 1252 11/27/19 1352   BP: 146/86 148/80   Pulse: (!) 124    Resp: 18    SpO2: 98%    Weight: 233 lb 9.6 oz (106 kg)    Height: 5' 10\" (1.778 m)     Body mass index is 33.81 kg/m². General: Well developed, in no acute distress. HEENT: No carotid bruits, no JVD, trach is midline. Heart:  Normal S1/S2 negative S3 or S4. Regular, no murmur, gallop or rub.   Respiratory: Clear bilaterally, no wheezing or rales  Abdomen:   Soft, non-tender, bowel sounds are active.   Extremities:  No edema, normal cap refill, no cyanosis. Neuro: A&Ox3, speech clear, gait stable. Skin: Skin color is normal. No rashes or lesions. No diaphoresis.   Vascular: 2+ pulses symmetric in all extremities        Data Review:       Cardiographics:    EKG: ST,LAE,minor st ST segment changes    Cardiology Labs:    Results for orders placed or performed during the hospital encounter of 11/13/18   EKG, 12 LEAD, INITIAL   Result Value Ref Range    Ventricular Rate 77 BPM    Atrial Rate 77 BPM    P-R Interval 136 ms    QRS Duration 82 ms    Q-T Interval 370 ms    QTC Calculation (Bezet) 418 ms    Calculated P Axis 26 degrees    Calculated R Axis 25 degrees    Calculated T Axis 9 degrees    Diagnosis       Normal sinus rhythm  When compared with ECG of 20-APR-2018 14:17,  No significant change was found  Confirmed by Alvy Fleischer, M.D., Sumanth Lester ([id number]) on 11/14/2018 10:11:39 AM         Lab Results   Component Value Date/Time    Cholesterol, total 197 06/29/2016 09:35 AM    HDL Cholesterol 35 (L) 06/29/2016 09:35 AM    LDL, calculated 121 (H) 06/29/2016 09:35 AM    Triglyceride 203 (H) 06/29/2016 09:35 AM       Lab Results   Component Value Date/Time    Sodium 140 11/13/2018 01:44 PM    Potassium 4.1 11/13/2018 01:44 PM    Chloride 107 11/13/2018 01:44 PM    CO2 25 11/13/2018 01:44 PM    Anion gap 8 11/13/2018 01:44 PM    Glucose 95 11/13/2018 01:44 PM    BUN 13 11/13/2018 01:44 PM    Creatinine 1.14 11/13/2018 01:44 PM    BUN/Creatinine ratio 11 (L) 11/13/2018 01:44 PM    GFR est AA >60 11/13/2018 01:44 PM    GFR est non-AA >60 11/13/2018 01:44 PM    Calcium 9.1 11/13/2018 01:44 PM    Bilirubin, total 0.5 11/13/2018 01:44 PM    AST (SGOT) 20 11/13/2018 01:44 PM    Alk. phosphatase 78 11/13/2018 01:44 PM    Protein, total 7.8 11/13/2018 01:44 PM    Albumin 3.9 11/13/2018 01:44 PM    Globulin 3.9 11/13/2018 01:44 PM    A-G Ratio 1.0 (L) 11/13/2018 01:44 PM    ALT (SGPT) 39 11/13/2018 01:44 PM          Assessment:       ICD-10-CM ICD-9-CM    1. Preoperative clearance Z01.818 V72.84 AMB POC EKG ROUTINE W/ 12 LEADS, INTER & REP   2. Hypertension, essential, benign I10 401.1    3. Non-occlusive coronary artery disease--30% RCA stenosis 4/18 I25.10 414.00    4. Myocardial bridge--60% LAD narrowing in systole Q24.5 746.85    5. ASHD (arteriosclerotic heart disease) I25.10 414.00 CANCELED: AMB POC EKG ROUTINE W/ 12 LEADS, INTER & REP   6. Dyslipidemia (high LDL; low HDL) E78.5 272.4 multivitamin (ONE A DAY) tablet   7. History of nephrolithiasis--s/p lithotripsy Z87.442 V13.01    8. Adjustment disorder with anxiety F43.22 309.24    9. Inappropriate sinus tachycardia R00.0 427.89          Discussion: Patient presents at this time stable from a cardiac perspective. As cardiac status stable, cleared for Urologic repeat lithotrypsy procedure/surgery at low cardiac risk. Meanwhile, restart and increase metoprolol to XL 50 mg every day for inappropriate ST and anxiety. Is not on Amlodipine. Keep diary. Pleased with present cardiac status. Plan: 1. Continue same meds. Lipid profile and labs followed by PCP. 2.Encouraged to exercise to tolerance, lose weight and follow low fat, low cholesterol, low sodium predominantly Plant-based (consider Mediterranean) diet. Call with questions or concerns. Will follow up any test results by phone and/or f/u here in office if needed. Vern Schmidt 3.Follow up: 6 MONTHS or sooner if need be. I have discussed the diagnosis with the patient and the intended plan as seen in the above orders. The patient has received an after-visit summary and questions were answered concerning future plans. I have discussed any concerning medication side effects and warnings with the patient as well.     Shahab Cadet MD,FAC,[facility]  11/27/2019

## 2020-04-20 RX ORDER — METOPROLOL SUCCINATE 50 MG/1
50 TABLET, EXTENDED RELEASE ORAL DAILY
Qty: 90 TAB | Refills: 0 | Status: SHIPPED | OUTPATIENT
Start: 2020-04-20 | End: 2020-06-11 | Stop reason: ALTCHOICE

## 2020-06-10 ENCOUNTER — TELEPHONE (OUTPATIENT)
Dept: CARDIOLOGY CLINIC | Age: 55
End: 2020-06-10

## 2020-06-10 NOTE — TELEPHONE ENCOUNTER
Received FLP results from PCP, done 1- was the most recent results. On Luis Antonio ASHLEY Luque desk for review at [facility] office.

## 2020-06-10 NOTE — TELEPHONE ENCOUNTER
----- Message from Aron Stern NP sent at 6/10/2020  4:04 PM EDT -----  Regarding: labs  Concord Cough please call PCP office to see the last time blood work was done on this patient specifically lipids. Please have them fax thank you.

## 2020-06-10 NOTE — TELEPHONE ENCOUNTER
Spoke with Dr. Karen Hall nurse, [phone]. Verified patient with two patient identifiers. Requested lipid panel be faxed to us. States she will fax them to us.

## 2020-06-10 NOTE — PROGRESS NOTES
[facility] @ [address] [place] [address]    Koki Hernández [facility], ANP-BC  Subjective/HPI:     Veronica Lozano is a 47 y.o. male is here for routine f/u. The patient denies chest pain/ shortness of breath, orthopnea, PND, LE edema, palpitations, syncope, presyncope. Patient reports he does notice dyspnea physical activity on the study is gained weight which coincides with his symptoms    Patient is a 77-year-old male previously followed by Dr. Constance Nieto has a history of nonobstructive atherosclerotic heart disease 30% lesion in the proximal RCA. Echocardiogram in 2016 demonstrates structurally normal heart and 2016 event monitor was without arrhythmia. Patient reports he has not been on amlodipine for well over 6 months, he self discontinued beta-blocker which have improved off of beta-blocker which was taken in the morning. Since being off beta-blocker he has not had any fluttering palpitations or feelings of tachycardia. Cardiac catheterization 4/2018    SUMMARY:     --  CARDIAC STRUCTURES:  --  Global left ventricular function was normal. EF calculated by contrast  ventriculography was 65 %.    --  CORONARY CIRCULATION:  --  Proximal RCA: There was a 30 % stenosis. Echocardiogram 2016    LEFT VENTRICLE: Size was normal. Systolic function was normal. Ejection  fraction was estimated to be 55 %. No obvious wall motion abnormalities  identified in the views obtained. There was mild concentric hypertrophy. DOPPLER: Left ventricular diastolic function parameters were normal.     RIGHT VENTRICLE: The size was normal. Systolic function was normal.  DOPPLER: Systolic pressure was within the normal range. Estimated peak  pressure was 22.1 mmHg.     LEFT ATRIUM: Size was normal. LA volume index was 16.3 ml/m squared.     RIGHT ATRIUM: Size was normal.     MITRAL VALVE: Normal valve structure. DOPPLER: There was trivial  regurgitation.     AORTIC VALVE: Normal valve structure and function. The valve was  trileaflet. Leaflets exhibited normal cuspal separation.     TRICUSPID VALVE: Normal valve structure. DOPPLER: There was trivial  regurgitation.     PULMONIC VALVE: Normal valve structure. DOPPLER: There was trivial  regurgitation.     AORTA: The root exhibited normal size. The ascending aorta was normal in  size.     PERICARDIUM: There was no pericardial effusion.     PCP Provider  Jim Philip MD  Past Medical History:   Diagnosis Date    Arthritis     Endocrine disease     Hypothyroid    Hypertension     Ill-defined condition     Timbo's Disease    Ill-defined condition     Anxiety      Past Surgical History:   Procedure Laterality Date    HX ORTHOPAEDIC      Knee    HX ORTHOPAEDIC      Hip     No Known Allergies   No family history on file. Current Outpatient Medications   Medication Sig    amLODIPine (NORVASC) 2.5 mg tablet Take 1 Tab by mouth daily.  multivitamin (ONE A DAY) tablet Take 1 Tab by mouth daily.  melatonin 1 mg tablet Take 1 mg by mouth nightly.  cholecalciferol, vitamin D3, (VITAMIN D3) 2,000 unit tab Take  by mouth daily. Indications: REPORTS TAKING SOMETIMES    DULoxetine (CYMBALTA) 30 mg capsule Take 1 Cap by mouth daily. No current facility-administered medications for this visit.        Vitals:    06/11/20 1540 06/11/20 1541   BP: 130/84 140/90   Pulse: 86    Resp: 20    SpO2: 97%    Weight: 247 lb 9.6 oz (112.3 kg)    Height: 5' 10\" (1.778 m)      Social History     Socioeconomic History    Marital status:      Spouse name: Not on file    Number of children: Not on file    Years of education: Not on file    Highest education level: Not on file   Occupational History    Not on file   Social Needs    Financial resource strain: Not on file    Food insecurity     Worry: Not on file     Inability: Not on file    Transportation needs     Medical: Not on file     Non-medical: Not on file   Tobacco Use    Smoking status: Never Smoker    Smokeless tobacco: Never Used   Substance and Sexual Activity    Alcohol use: Yes     Alcohol/week: 0.0 standard drinks     Comment: occasional    Drug use: No    Sexual activity: Not on file   Lifestyle    Physical activity     Days per week: Not on file     Minutes per session: Not on file    Stress: Not on file   Relationships    Social connections     Talks on phone: Not on file     Gets together: Not on file     Attends Cheondoism service: Not on file     Active member of club or organization: Not on file     Attends meetings of clubs or organizations: Not on file     Relationship status: Not on file    Intimate partner violence     Fear of current or ex partner: Not on file     Emotionally abused: Not on file     Physically abused: Not on file     Forced sexual activity: Not on file   Other Topics Concern    Not on file   Social History Narrative    Not on file       I have reviewed the nurses notes, vitals, problem list, allergy list, medical history, family, social history and medications. Review of Symptoms  11 systems reviewed, negative other than as stated in the HPI      Physical Exam:      General: Well developed, in no acute distress, cooperative and alert  HEENT: No carotid bruits, no JVD, trach is midline. Neck Supple, PERRL, EOM intact. Heart:  Normal S1/S2 negative S3 or S4. Regular, no murmur, gallop or rub. Respiratory: Clear bilaterally x 4, no wheezing or rales  Abdomen:   Soft, non-tender, no masses, bowel sounds are active. Extremities:  No edema, normal cap refill, no cyanosis, atraumatic. Neuro: A&Ox3, speech clear, gait stable. Skin: Skin color is normal. No rashes or lesions.  Non diaphoretic  Vascular: 2+ pulses symmetric in all extremities    Cardiographics    ECG: Sinus rhythm        Cardiology Labs:  Lab Results   Component Value Date/Time    Cholesterol, total 197 06/29/2016 09:35 AM    HDL Cholesterol 35 (L) 06/29/2016 09:35 AM    LDL, calculated 121 (H) 06/29/2016 09:35 AM    Triglyceride 203 (H) 06/29/2016 09:35 AM       Lab Results   Component Value Date/Time    Sodium 140 11/13/2018 01:44 PM    Potassium 4.1 11/13/2018 01:44 PM    Chloride 107 11/13/2018 01:44 PM    CO2 25 11/13/2018 01:44 PM    Anion gap 8 11/13/2018 01:44 PM    Glucose 95 11/13/2018 01:44 PM    BUN 13 11/13/2018 01:44 PM    Creatinine 1.14 11/13/2018 01:44 PM    BUN/Creatinine ratio 11 (L) 11/13/2018 01:44 PM    GFR est AA >60 11/13/2018 01:44 PM    GFR est non-AA >60 11/13/2018 01:44 PM    Calcium 9.1 11/13/2018 01:44 PM    Bilirubin, total 0.5 11/13/2018 01:44 PM    Alk. phosphatase 78 11/13/2018 01:44 PM    Protein, total 7.8 11/13/2018 01:44 PM    Albumin 3.9 11/13/2018 01:44 PM    Globulin 3.9 11/13/2018 01:44 PM    A-G Ratio 1.0 (L) 11/13/2018 01:44 PM    ALT (SGPT) 39 11/13/2018 01:44 PM           Assessment:     Assessment:     Diagnoses and all orders for this visit:    1. Hypertension, essential, benign  -     AMB POC EKG ROUTINE W/ 12 LEADS, INTER & REP  -     METABOLIC PANEL, COMPREHENSIVE  -     LIPID PANEL  -     CK    2. Non-occlusive coronary artery disease  -     AMB POC EKG ROUTINE W/ 12 LEADS, INTER & REP  -     amLODIPine (NORVASC) 2.5 mg tablet; Take 1 Tab by mouth daily.  -     METABOLIC PANEL, COMPREHENSIVE  -     LIPID PANEL  -     CK    3. Dyslipidemia (high LDL; low HDL)  -     AMB POC EKG ROUTINE W/ 12 LEADS, INTER & REP  -     METABOLIC PANEL, COMPREHENSIVE  -     LIPID PANEL  -     CK    4. Myocardial bridge-LAD to 60%  -     amLODIPine (NORVASC) 2.5 mg tablet; Take 1 Tab by mouth daily.  -     METABOLIC PANEL, COMPREHENSIVE  -     LIPID PANEL  -     CK        ICD-10-CM ICD-9-CM    1. Hypertension, essential, benign I10 401.1 AMB POC EKG ROUTINE W/ 12 LEADS, INTER & REP      METABOLIC PANEL, COMPREHENSIVE      LIPID PANEL      CK   2.  Non-occlusive coronary artery disease I25.10 414.00 AMB POC EKG ROUTINE W/ 12 LEADS, INTER & REP      amLODIPine (NORVASC) 2.5 mg tablet      METABOLIC PANEL, COMPREHENSIVE      LIPID PANEL      CK   3. Dyslipidemia (high LDL; low HDL) E78.5 272.4 AMB POC EKG ROUTINE W/ 12 LEADS, INTER & REP      METABOLIC PANEL, COMPREHENSIVE      LIPID PANEL      CK   4. Myocardial bridge-LAD to 60% Q24.5 746.85 amLODIPine (NORVASC) 2.5 mg tablet      METABOLIC PANEL, COMPREHENSIVE      LIPID PANEL      CK     Orders Placed This Encounter    METABOLIC PANEL, COMPREHENSIVE    LIPID PANEL    CK    AMB POC EKG ROUTINE W/ 12 LEADS, INTER & REP     Order Specific Question:   Reason for Exam:     Answer:   routine    amLODIPine (NORVASC) 2.5 mg tablet     Sig: Take 1 Tab by mouth daily. Dispense:  90 Tab     Refill:  1        Plan:     1. Nonobstructive atherosclerotic heart disease: 30% lesion in the proximal right coronary artery. 2.  Hypertension: Resume felodipine 2.5 mg daily. 3.  Hyperlipidemia: 1/25/2019  HDL 38 Trig 220 will repeat lab work and discussed with patient high probability I will be recommending statin therapy, patient is in agreement to start therapy if recommended. He states he will also work on diet exercising and weight loss. Goal LDL under 100 ideally closer to 70 given nonobstructive CAD. Stable from cardiac perspective, will be working with patient on secondary risk factor modifications. Follow-up with me in 6 months. Alonzo Marie NP      Please note that this dictation was completed with [employer], the computer voice recognition software. Quite often unanticipated grammatical, syntax, homophones, and other interpretive errors are inadvertently transcribed by the computer software. Please disregard these errors. Please excuse any errors that have escaped final proofreading. Thank you.

## 2020-06-10 NOTE — TELEPHONE ENCOUNTER
----- Message from Cameron العلي NP sent at 6/10/2020  4:04 PM EDT -----  Regarding: labs  Antony Thakur please call PCP office to see the last time blood work was done on this patient specifically lipids. Please have them fax thank you.

## 2020-06-11 ENCOUNTER — OFFICE VISIT (OUTPATIENT)
Dept: CARDIOLOGY CLINIC | Age: 55
End: 2020-06-11

## 2020-06-11 VITALS
OXYGEN SATURATION: 97 % | DIASTOLIC BLOOD PRESSURE: 90 MMHG | HEART RATE: 86 BPM | RESPIRATION RATE: 20 BRPM | SYSTOLIC BLOOD PRESSURE: 140 MMHG | HEIGHT: 70 IN | WEIGHT: 247.6 LBS | BODY MASS INDEX: 35.45 KG/M2

## 2020-06-11 DIAGNOSIS — I10 HYPERTENSION, ESSENTIAL, BENIGN: Primary | ICD-10-CM

## 2020-06-11 DIAGNOSIS — I25.10 NON-OCCLUSIVE CORONARY ARTERY DISEASE: ICD-10-CM

## 2020-06-11 DIAGNOSIS — E78.5 DYSLIPIDEMIA (HIGH LDL; LOW HDL): ICD-10-CM

## 2020-06-11 DIAGNOSIS — Q24.5 MYOCARDIAL BRIDGE: ICD-10-CM

## 2020-06-11 RX ORDER — AMLODIPINE BESYLATE 2.5 MG/1
2.5 TABLET ORAL DAILY
Qty: 90 TAB | Refills: 1 | Status: SHIPPED | OUTPATIENT
Start: 2020-06-11

## 2020-06-11 NOTE — PROGRESS NOTES
Chief Complaint   Patient presents with    Follow-up    Hypertension    Cholesterol Problem       1. Have you been to the ER, urgent care clinic since your last visit? Hospitalized since your last visit? 2. Have you seen or consulted any other health care providers outside of the [address] [place] [address] since your last visit? Include any pap smears or colon screening. Urology 11/2019 lithotripsy. Patient takes Metoprolol sometimes C/O weight gain and heart racing with activity. Pounding of heart occurs with or without medication. Soreness to chest with activity.

## 2021-10-20 ENCOUNTER — TRANSCRIBE ORDER (OUTPATIENT)
Dept: SCHEDULING | Age: 56
End: 2021-10-20

## 2021-10-20 DIAGNOSIS — R10.9 ACUTE ABDOMINAL PAIN: Primary | ICD-10-CM

## 2022-02-02 ENCOUNTER — TRANSCRIBE ORDER (OUTPATIENT)
Dept: SCHEDULING | Age: 57
End: 2022-02-02

## 2022-02-02 DIAGNOSIS — I10 ESSENTIAL HYPERTENSION, MALIGNANT: Primary | ICD-10-CM

## 2022-02-03 ENCOUNTER — HOSPITAL ENCOUNTER (OUTPATIENT)
Dept: ULTRASOUND IMAGING | Age: 57
Discharge: HOME | End: 2022-02-03
Attending: NURSE PRACTITIONER
Payer: COMMERCIAL

## 2022-02-03 DIAGNOSIS — I10 ESSENTIAL HYPERTENSION, MALIGNANT: ICD-10-CM

## 2022-02-03 PROCEDURE — 76700 US EXAM ABDOM COMPLETE: CPT

## 2022-02-10 ENCOUNTER — HOSPITAL ENCOUNTER (EMERGENCY)
Age: 57
Discharge: HOME | End: 2022-02-10
Attending: EMERGENCY MEDICINE
Payer: COMMERCIAL

## 2022-02-10 ENCOUNTER — APPOINTMENT (OUTPATIENT)
Dept: CT IMAGING | Age: 57
End: 2022-02-10
Attending: EMERGENCY MEDICINE
Payer: COMMERCIAL

## 2022-02-10 VITALS
TEMPERATURE: 98.1 F | DIASTOLIC BLOOD PRESSURE: 96 MMHG | OXYGEN SATURATION: 100 % | RESPIRATION RATE: 18 BRPM | BODY MASS INDEX: 37.34 KG/M2 | HEIGHT: 70 IN | WEIGHT: 260.8 LBS | SYSTOLIC BLOOD PRESSURE: 166 MMHG | HEART RATE: 100 BPM

## 2022-02-10 DIAGNOSIS — R10.9 ACUTE ABDOMINAL PAIN: Primary | ICD-10-CM

## 2022-02-10 LAB
ALBUMIN SERPL-MCNC: 3.8 G/DL (ref 3.5–5)
ALBUMIN/GLOB SERPL: 0.8 {RATIO} (ref 1.1–2.2)
ALP SERPL-CCNC: 96 U/L (ref 45–117)
ALT SERPL-CCNC: 39 U/L (ref 12–78)
ANION GAP SERPL CALC-SCNC: 8 MMOL/L (ref 5–15)
APPEARANCE UR: CLEAR
AST SERPL-CCNC: 19 U/L (ref 15–37)
BILIRUB SERPL-MCNC: 0.6 MG/DL (ref 0.2–1)
BILIRUB UR QL: NEGATIVE
BUN SERPL-MCNC: 14 MG/DL (ref 6–20)
BUN/CREAT SERPL: 11 (ref 12–20)
CALCIUM SERPL-MCNC: 9.6 MG/DL (ref 8.5–10.1)
CHLORIDE SERPL-SCNC: 102 MMOL/L (ref 97–108)
CO2 SERPL-SCNC: 26 MMOL/L (ref 21–32)
COLOR UR: NORMAL
CREAT SERPL-MCNC: 1.26 MG/DL (ref 0.7–1.3)
ERYTHROCYTE [DISTWIDTH] IN BLOOD BY AUTOMATED COUNT: 13.9 % (ref 11.5–14.5)
GLOBULIN SER CALC-MCNC: 4.7 G/DL (ref 2–4)
GLUCOSE SERPL-MCNC: 119 MG/DL (ref 65–100)
GLUCOSE UR STRIP.AUTO-MCNC: NEGATIVE MG/DL
HCT VFR BLD AUTO: 44.7 % (ref 36.6–50.3)
HGB BLD-MCNC: 14.7 G/DL (ref 12.1–17)
HGB UR QL STRIP: NEGATIVE
KETONES UR QL STRIP.AUTO: NEGATIVE MG/DL
LEUKOCYTE ESTERASE UR QL STRIP.AUTO: NEGATIVE
LIPASE SERPL-CCNC: 106 U/L (ref 73–393)
MCH RBC QN AUTO: 28.9 PG (ref 26–34)
MCHC RBC AUTO-ENTMCNC: 32.9 G/DL (ref 30–36.5)
MCV RBC AUTO: 88 FL (ref 80–99)
NITRITE UR QL STRIP.AUTO: NEGATIVE
NRBC # BLD: 0 K/UL (ref 0–0.01)
NRBC BLD-RTO: 0 PER 100 WBC
PH UR STRIP: 5.5 [PH] (ref 5–8)
PLATELET # BLD AUTO: 284 K/UL (ref 150–400)
PMV BLD AUTO: 9.5 FL (ref 8.9–12.9)
POTASSIUM SERPL-SCNC: 4 MMOL/L (ref 3.5–5.1)
PROT SERPL-MCNC: 8.5 G/DL (ref 6.4–8.2)
PROT UR STRIP-MCNC: NEGATIVE MG/DL
RBC # BLD AUTO: 5.08 M/UL (ref 4.1–5.7)
SODIUM SERPL-SCNC: 136 MMOL/L (ref 136–145)
SP GR UR REFRACTOMETRY: 1.01 (ref 1–1.03)
UROBILINOGEN UR QL STRIP.AUTO: 0.2 EU/DL (ref 0.2–1)
WBC # BLD AUTO: 9.8 K/UL (ref 4.1–11.1)

## 2022-02-10 PROCEDURE — 83690 ASSAY OF LIPASE: CPT

## 2022-02-10 PROCEDURE — 99282 EMERGENCY DEPT VISIT SF MDM: CPT

## 2022-02-10 PROCEDURE — 85027 COMPLETE CBC AUTOMATED: CPT

## 2022-02-10 PROCEDURE — 74011250637 HC RX REV CODE- 250/637: Performed by: EMERGENCY MEDICINE

## 2022-02-10 PROCEDURE — 81003 URINALYSIS AUTO W/O SCOPE: CPT

## 2022-02-10 PROCEDURE — 74011000636 HC RX REV CODE- 636: Performed by: EMERGENCY MEDICINE

## 2022-02-10 PROCEDURE — 74011000250 HC RX REV CODE- 250: Performed by: EMERGENCY MEDICINE

## 2022-02-10 PROCEDURE — 74011250636 HC RX REV CODE- 250/636: Performed by: EMERGENCY MEDICINE

## 2022-02-10 PROCEDURE — 96374 THER/PROPH/DIAG INJ IV PUSH: CPT

## 2022-02-10 PROCEDURE — 74177 CT ABD & PELVIS W/CONTRAST: CPT

## 2022-02-10 PROCEDURE — 36415 COLL VENOUS BLD VENIPUNCTURE: CPT

## 2022-02-10 PROCEDURE — 80053 COMPREHEN METABOLIC PANEL: CPT

## 2022-02-10 RX ORDER — DICYCLOMINE HYDROCHLORIDE 20 MG/1
20 TABLET ORAL
Status: COMPLETED | OUTPATIENT
Start: 2022-02-10 | End: 2022-02-10

## 2022-02-10 RX ORDER — SUCRALFATE 1 G/1
1 TABLET ORAL 4 TIMES DAILY
Qty: 30 TABLET | Refills: 0 | Status: SHIPPED | OUTPATIENT
Start: 2022-02-10

## 2022-02-10 RX ORDER — FAMOTIDINE 20 MG/1
20 TABLET, FILM COATED ORAL 2 TIMES DAILY
Qty: 20 TABLET | Refills: 0 | Status: SHIPPED | OUTPATIENT
Start: 2022-02-10 | End: 2022-02-20

## 2022-02-10 RX ORDER — DICYCLOMINE HYDROCHLORIDE 20 MG/1
20 TABLET ORAL EVERY 6 HOURS
Qty: 30 TABLET | Refills: 0 | Status: SHIPPED | OUTPATIENT
Start: 2022-02-10

## 2022-02-10 RX ORDER — ACETAMINOPHEN 325 MG/1
975 TABLET ORAL
Qty: 20 TABLET | Refills: 0 | Status: SHIPPED | OUTPATIENT
Start: 2022-02-10

## 2022-02-10 RX ORDER — KETOROLAC TROMETHAMINE 30 MG/ML
30 INJECTION, SOLUTION INTRAMUSCULAR; INTRAVENOUS
Status: COMPLETED | OUTPATIENT
Start: 2022-02-10 | End: 2022-02-10

## 2022-02-10 RX ADMIN — KETOROLAC TROMETHAMINE 30 MG: 30 INJECTION, SOLUTION INTRAMUSCULAR; INTRAVENOUS at 14:49

## 2022-02-10 RX ADMIN — LIDOCAINE HYDROCHLORIDE 40 ML: 20 SOLUTION ORAL; TOPICAL at 11:44

## 2022-02-10 RX ADMIN — DICYCLOMINE HYDROCHLORIDE 20 MG: 20 TABLET ORAL at 11:44

## 2022-02-10 RX ADMIN — IOPAMIDOL 100 ML: 755 INJECTION, SOLUTION INTRAVENOUS at 12:40

## 2022-02-10 NOTE — ED PROVIDER NOTES
EMERGENCY DEPARTMENT HISTORY AND PHYSICAL EXAM      Date: 2/10/2022  Patient Name: Vannesa Benavides    History of Presenting Illness     Chief Complaint   Patient presents with    Abdominal Pain     Pt ambulatory into triage with a cc of RQ abominal pain since yesterday; pt saw PCP last week and had UA done and results were negative; pt states he has a kidney stone in his right kideny but the pain does not feel the same; pt states his abdomen is distended and \"when i sit up it looks like im birthing a football\".  Urinary Frequency     Pt also complains of urinary frequency, pt states that he feels the urge to urinate but only a couple of drops actually come out       History Provided By: Patient    HPI: Vannesa Benavides, 64 y.o. male with a past medical history significant for Hypertension, anxiety, diverticulitis, medical problems as stated below presents to the ED with cc of moderate to severe right-sided abdominal pain over the last 2 days. Patient reports his presentation now is pretty similar to what it has been over the last several months. He has had ongoing progressive right-sided pain that seems to more significant in the upper quadrants. It is worse with eating and associated with some loose stools. He reports feeling \"bloated\" on the right side of his abdomen particularly after eating. He is not having nausea or vomiting or any fevers or chills. He is not having any anorexia and taking fluids and solids normally. He does report a slight decrease in his appetite. He has had an outpatient ultrasound of his liver and kidneys which showed a small kidney stone in the kidney but no other findings. He is a once a week drinker and only has 2 drinks at a time. He does not smoke or use any drugs. He has no abdominal surgical history. His stools are light brown and loose but not particularly watery and are without any blood or mucus. No other associated symptoms.   No other exacerbating ameliorating factors. There are no other complaints, changes, or physical findings at this time. PCP: Ismael Bai MD    No current facility-administered medications on file prior to encounter. Current Outpatient Medications on File Prior to Encounter   Medication Sig Dispense Refill    amLODIPine (NORVASC) 2.5 mg tablet Take 1 Tab by mouth daily. 90 Tab 1    melatonin 1 mg tablet Take 1 mg by mouth nightly.  multivitamin (ONE A DAY) tablet Take 1 Tab by mouth daily.  cholecalciferol, vitamin D3, (VITAMIN D3) 2,000 unit tab Take  by mouth daily. Indications: REPORTS TAKING SOMETIMES      DULoxetine (CYMBALTA) 30 mg capsule Take 1 Cap by mouth daily. 30 Cap 0       Past History     Past Medical History:  Past Medical History:   Diagnosis Date    Arthritis     Endocrine disease     Hypothyroid    Hypertension     Ill-defined condition     Timbo's Disease    Ill-defined condition     Anxiety       Past Surgical History:  Past Surgical History:   Procedure Laterality Date    HX ORTHOPAEDIC      Knee    HX ORTHOPAEDIC      Hip       Family History:  No family history on file. Social History:  Social History     Tobacco Use    Smoking status: Never Smoker    Smokeless tobacco: Never Used   Substance Use Topics    Alcohol use: Yes     Alcohol/week: 0.0 standard drinks     Comment: occasional    Drug use: No       Allergies:  No Known Allergies      Review of Systems   Review of Systems   Constitutional: Negative for chills, diaphoresis, fatigue and fever. HENT: Negative for ear pain and sore throat. Eyes: Negative for pain and redness. Respiratory: Negative for cough and shortness of breath. Cardiovascular: Negative for chest pain and leg swelling. Gastrointestinal: Positive for abdominal pain and diarrhea. Negative for nausea and vomiting. Endocrine: Negative for cold intolerance and heat intolerance. Genitourinary: Positive for difficulty urinating.  Negative for flank pain and hematuria. Musculoskeletal: Negative for back pain and neck stiffness. Skin: Negative for rash and wound. Neurological: Negative for dizziness, syncope and headaches. All other systems reviewed and are negative. Physical Exam   Physical Exam  Vitals and nursing note reviewed. Constitutional:       Appearance: He is well-developed. HENT:      Head: Normocephalic and atraumatic. Mouth/Throat:      Pharynx: No oropharyngeal exudate. Eyes:      Conjunctiva/sclera: Conjunctivae normal.      Pupils: Pupils are equal, round, and reactive to light. Cardiovascular:      Rate and Rhythm: Normal rate and regular rhythm. Heart sounds: No murmur heard. Pulmonary:      Effort: Pulmonary effort is normal. No respiratory distress. Breath sounds: Normal breath sounds. No wheezing. Abdominal:      General: Bowel sounds are normal. There is no distension. Palpations: Abdomen is soft. Tenderness: There is abdominal tenderness in the right upper quadrant, right lower quadrant and suprapubic area. There is no right CVA tenderness, left CVA tenderness, guarding or rebound. Musculoskeletal:         General: No deformity. Normal range of motion. Cervical back: Normal range of motion. Skin:     General: Skin is warm and dry. Findings: No rash. Neurological:      Mental Status: He is alert and oriented to person, place, and time.       Coordination: Coordination normal.   Psychiatric:         Behavior: Behavior normal.         Diagnostic Study Results     Labs -     Recent Results (from the past 24 hour(s))   CBC W/O DIFF    Collection Time: 02/10/22 10:57 AM   Result Value Ref Range    WBC 9.8 4.1 - 11.1 K/uL    RBC 5.08 4.10 - 5.70 M/uL    HGB 14.7 12.1 - 17.0 g/dL    HCT 44.7 36.6 - 50.3 %    MCV 88.0 80.0 - 99.0 FL    MCH 28.9 26.0 - 34.0 PG    MCHC 32.9 30.0 - 36.5 g/dL    RDW 13.9 11.5 - 14.5 %    PLATELET 665 364 - 261 K/uL    MPV 9.5 8.9 - 12.9 FL NRBC 0.0 0  WBC    ABSOLUTE NRBC 0.00 0.00 - 0.46 K/uL   METABOLIC PANEL, COMPREHENSIVE    Collection Time: 02/10/22 10:57 AM   Result Value Ref Range    Sodium 136 136 - 145 mmol/L    Potassium 4.0 3.5 - 5.1 mmol/L    Chloride 102 97 - 108 mmol/L    CO2 26 21 - 32 mmol/L    Anion gap 8 5 - 15 mmol/L    Glucose 119 (H) 65 - 100 mg/dL    BUN 14 6 - 20 MG/DL    Creatinine 1.26 0.70 - 1.30 MG/DL    BUN/Creatinine ratio 11 (L) 12 - 20      GFR est AA >60 >60 ml/min/1.73m2    GFR est non-AA 59 (L) >60 ml/min/1.73m2    Calcium 9.6 8.5 - 10.1 MG/DL    Bilirubin, total 0.6 0.2 - 1.0 MG/DL    ALT (SGPT) 39 12 - 78 U/L    AST (SGOT) 19 15 - 37 U/L    Alk. phosphatase 96 45 - 117 U/L    Protein, total 8.5 (H) 6.4 - 8.2 g/dL    Albumin 3.8 3.5 - 5.0 g/dL    Globulin 4.7 (H) 2.0 - 4.0 g/dL    A-G Ratio 0.8 (L) 1.1 - 2.2     LIPASE    Collection Time: 02/10/22 10:57 AM   Result Value Ref Range    Lipase 106 73 - 393 U/L   URINALYSIS W/ RFLX MICROSCOPIC    Collection Time: 02/10/22 11:23 AM   Result Value Ref Range    Color YELLOW/STRAW      Appearance CLEAR CLEAR      Specific gravity 1.010 1.003 - 1.030      pH (UA) 5.5 5.0 - 8.0      Protein Negative NEG mg/dL    Glucose Negative NEG mg/dL    Ketone Negative NEG mg/dL    Bilirubin Negative NEG      Blood Negative NEG      Urobilinogen 0.2 0.2 - 1.0 EU/dL    Nitrites Negative NEG      Leukocyte Esterase Negative NEG         Radiologic Studies -   CT ABD PELV W CONT   Final Result   No acute intraperitoneal process is identified. Please see above for additional nonemergent incidental findings. CT Results  (Last 48 hours)               02/10/22 1240  CT ABD PELV W CONT Final result    Impression:  No acute intraperitoneal process is identified. Please see above for additional nonemergent incidental findings.            Narrative:      CLINICAL HISTORY: Worsening right-sided abdominal pain, distention, history of   kidney stone, difficulty with urination, history of diverticulitis   INDICATION: Worsening right-sided abdominal pain, distention, history of kidney   stone, difficulty with urination, history of diverticulitis   COMPARISON: 11/19/2019. CONTRAST: 100  ml Isovue 370   TECHNIQUE:    Multislice helical CT was performed of the abdomen and pelvis following   uneventful rapid bolus intravenous contrast administration. Oral contrast was   not administered. Contiguous 5 mm axial images were reconstructed and lung and   soft tissue windows were generated. Coronal and sagittal reformations were   generated. CT dose reduction was achieved through use of a standardized   protocol tailored for this examination and automatic exposure control for dose   modulation. FINDINGS:   LUNG BASES:Granuloma at the left lung base    LIVER: Hepatic steatosis There is no intrahepatic duct dilatation. There is no   hepatic parenchymal mass. Hepatic enhancement pattern is within normal limits. Portal vein is patent. GALLBLADDER:  No dilatation or wall thickening. SPLEEN/PANCREAS: No mass. There is no pancreatic duct dilatation. There is no   evidence of splenomegaly. ADRENALS/KIDNEYS: No mass. There is no hydronephrosis. There is no renal mass. There is no perinephric mass. STOMACH: No dilatation or wall thickening. COLON AND SMALL BOWEL: Few colonic diverticula. Fecal stasis. There is no free   intraperitoneal air. There is no evidence of incarceration or obstruction. No   mesenteric adenopathy. PERITONEUM: Unremarkable. APPENDIX: Unremarkable. BLADDER/REPRODUCTIVE ORGANS: No mass or calculus. RETROPERITONEUM: Unremarkable. The abdominal aorta is normal in caliber. No   aneurysm. No retroperitoneal adenopathy. OSSEOUS STRUCTURES: Degenerative changes. Canal stenosis at L3-4 and L4-5. CXR Results  (Last 48 hours)    None            Medical Decision Making   I am the first provider for this patient.     I reviewed the vital signs, available nursing notes, past medical history, past surgical history, family history and social history. Vital Signs-Reviewed the patient's vital signs. Patient Vitals for the past 12 hrs:   Temp Pulse Resp BP SpO2   02/10/22 1053 98.1 °F (36.7 °C) 100 18 (!) 166/96 100 %       Records Reviewed: Nursing records and medical records reviewed    MDM:  Pt presents with acute abdominal pain; vital signs stable with currently a non-peritoneal exam; DDx includes: Gastroenteritis, hepatitis, pancreatitis, obstruction, appendicitis, viral illness, IBD, diverticulitis, mesenteric ischemia, AAA or descending dissection, ACS, kidney stone. Will get labs, treat symptomatically and obtain serial abdominal exams to determine if a CT is warranted. Will reassess and monitor closely. Provider Notes (Medical Decision Making):   Patient is a 43-year-old male presenting with some right-sided abdominal pain that seems to be subacute in nature. It is intermittent, right-sided, more in the upper quadrants. She he is having some very mild urinary hesitancy but no evidence of urinary retention and is urinating freely here. His CT scan showed no evidence of diverticulitis, appendicitis, obstructing stone, or any surgical causes to his abdominal pain. He has a mild improvement with Toradol and the GI cocktail provided. I suspect this is more related to either dyspepsia of some sort given that he does have some loose stools, and a clear association with different p.o. intake events. However, I will refer him back to his primary care doctor and to GI as an outpatient for further work-up and management and put him on antispasmodic as well as a antacid and a strict bland diet. ED Course:   Initial assessment performed. The patients presenting problems have been discussed, and they are in agreement with the care plan formulated and outlined with them.   I have encouraged them to ask questions as they arise throughout their visit.                 Critical Care:  None      Disposition:  6:05 PM  Tasha Encinas's  results have been reviewed with him. He has been counseled regarding his diagnosis. He verbally conveys understanding and agreement of the signs, symptoms, diagnosis, treatment and prognosis and additionally agrees to follow up as recommended with Dr. Grace Rivero MD in 24 - 48 hours. He also agrees with the care-plan and conveys that all of his questions have been answered. I have also put together some discharge instructions for him that include: 1) educational information regarding their diagnosis, 2) how to care for their diagnosis at home, as well a 3) list of reasons why they would want to return to the ED prior to their follow-up appointment, should their condition change. DISCHARGE PLAN:  1. Discharge Medication List as of 2/10/2022  2:52 PM      START taking these medications    Details   sucralfate (Carafate) 1 gram tablet Take 1 Tablet by mouth four (4) times daily. , Normal, Disp-30 Tablet, R-0      famotidine (Pepcid) 20 mg tablet Take 1 Tablet by mouth two (2) times a day for 10 days. , Normal, Disp-20 Tablet, R-0      dicyclomine (BENTYL) 20 mg tablet Take 1 Tablet by mouth every six (6) hours. , Normal, Disp-30 Tablet, R-0      acetaminophen (TYLENOL) 325 mg tablet Take 3 Tablets by mouth every six (6) hours as needed for Pain., Normal, Disp-20 Tablet, R-0         CONTINUE these medications which have NOT CHANGED    Details   amLODIPine (NORVASC) 2.5 mg tablet Take 1 Tab by mouth daily. , Normal, Disp-90 Tab, R-1      melatonin 1 mg tablet Take 1 mg by mouth nightly., Historical Med      multivitamin (ONE A DAY) tablet Take 1 Tab by mouth daily. , Historical Med      cholecalciferol, vitamin D3, (VITAMIN D3) 2,000 unit tab Take  by mouth daily. Indications: REPORTS TAKING SOMETIMES, Historical Med      DULoxetine (CYMBALTA) 30 mg capsule Take 1 Cap by mouth daily. , Print, Disp-30 Cap, R-0 2.   Follow-up Information     Follow up With Specialties Details Why Contact Info    Sergio Ibrahim MD Family Medicine In 1 week For a follow-up evaluation. [address] [facility] [address]  [address] [place] [address]  [facility] In 1 week For a follow-up evaluation for your kidney stones and for your intermittent difficulty with urination. [facility]  [address] [facility]  [phone]    Shreya Green MD Gastroenterology In 3 days For a follow-up evaluation. [address] [facility]  [address] [facility]  [phone]      [facility] EMERGENCY DEPT Emergency Medicine In 2 days If symptoms worsen [address] [facility]  [address] [facility]  [phone]        3. Return to ED if worse     Diagnosis     Clinical Impression:   1. Acute abdominal pain        Attestations:    Perry Sanchez MD    Please note that this dictation was completed with [employer], the computer voice recognition software. Quite often unanticipated grammatical, syntax, homophones, and other interpretive errors are inadvertently transcribed by the computer software. Please disregard these errors. Please excuse any errors that have escaped final proofreading. Thank you.

## 2022-02-10 NOTE — DISCHARGE INSTRUCTIONS
It was a pleasure taking care of you at [facility] Emergency Department today. We know that when you come to [facility], you are entrusting us with your health, comfort, and safety. Our physicians and nurses honor that trust, and we truly appreciate the opportunity to care for you and your loved ones. We also value your feedback. If you receive a survey about your Emergency Department experience today, please fill it out. We care about our patients' feedback, and we listen to what you have to say. Thank you!

## 2022-02-17 ENCOUNTER — TRANSCRIBE ORDER (OUTPATIENT)
Dept: SCHEDULING | Age: 57
End: 2022-02-17

## 2022-02-17 DIAGNOSIS — K59.00 CONSTIPATION: ICD-10-CM

## 2022-02-17 DIAGNOSIS — R14.0 ABDOMINAL BLOATING: ICD-10-CM

## 2022-02-17 DIAGNOSIS — R10.11 ABDOMINAL PAIN, RUQ: ICD-10-CM

## 2022-02-17 DIAGNOSIS — R19.4 CHANGE IN BOWEL HABITS: Primary | ICD-10-CM

## 2022-02-28 ENCOUNTER — HOSPITAL ENCOUNTER (OUTPATIENT)
Dept: NUCLEAR MEDICINE | Age: 57
Discharge: HOME | End: 2022-02-28
Attending: NURSE PRACTITIONER
Payer: COMMERCIAL

## 2022-02-28 VITALS — BODY MASS INDEX: 35.87 KG/M2 | WEIGHT: 250 LBS

## 2022-02-28 DIAGNOSIS — R10.11 ABDOMINAL PAIN, RUQ: ICD-10-CM

## 2022-02-28 DIAGNOSIS — R14.0 ABDOMINAL BLOATING: ICD-10-CM

## 2022-02-28 DIAGNOSIS — R19.4 CHANGE IN BOWEL HABITS: ICD-10-CM

## 2022-02-28 DIAGNOSIS — K59.00 CONSTIPATION: ICD-10-CM

## 2022-02-28 PROCEDURE — 74011250636 HC RX REV CODE- 250/636: Performed by: NURSE PRACTITIONER

## 2022-02-28 PROCEDURE — 78227 HEPATOBIL SYST IMAGE W/DRUG: CPT

## 2022-02-28 RX ORDER — KIT FOR THE PREPARATION OF TECHNETIUM TC 99M MEBROFENIN 45 MG/10ML
5.3 INJECTION, POWDER, LYOPHILIZED, FOR SOLUTION INTRAVENOUS
Status: COMPLETED | OUTPATIENT
Start: 2022-02-28 | End: 2022-02-28

## 2022-02-28 RX ADMIN — SINCALIDE 2.27 MCG: 5 INJECTION, POWDER, LYOPHILIZED, FOR SOLUTION INTRAVENOUS at 10:10

## 2022-02-28 RX ADMIN — KIT FOR THE PREPARATION OF TECHNETIUM TC 99M MEBROFENIN 5.3 MILLICURIE: 45 INJECTION, POWDER, LYOPHILIZED, FOR SOLUTION INTRAVENOUS at 09:10

## 2022-02-28 NOTE — PROGRESS NOTES
NUC MED: CCK Intervention:  2.27 mcg CCK given @ 10:10 am IV LAC. During CCK infusion pt had symptoms.

## 2022-03-18 PROBLEM — F32.A DEPRESSION: Status: ACTIVE | Noted: 2018-11-13

## 2022-03-19 PROBLEM — I25.10 NON-OCCLUSIVE CORONARY ARTERY DISEASE: Status: ACTIVE | Noted: 2018-04-20

## 2022-03-19 PROBLEM — Z82.49 FAMILY HISTORY OF ISCHEMIC HEART DISEASE: Status: ACTIVE | Noted: 2018-04-20

## 2022-03-19 PROBLEM — R07.9 CHEST PAIN WITH HIGH RISK OF ACUTE CORONARY SYNDROME: Status: ACTIVE | Noted: 2018-04-20

## 2022-03-19 PROBLEM — Q24.5 MYOCARDIAL BRIDGE: Status: ACTIVE | Noted: 2018-04-20

## 2022-03-20 PROBLEM — I20.0 UNSTABLE ANGINA (HCC): Status: ACTIVE | Noted: 2018-04-20

## 2023-09-29 ENCOUNTER — HOSPITAL ENCOUNTER (OUTPATIENT)
Facility: HOSPITAL | Age: 58
Discharge: HOME | End: 2023-09-29
Attending: ORTHOPAEDIC SURGERY
Payer: COMMERCIAL

## 2023-09-29 DIAGNOSIS — M19.012 ARTHRITIS OF LEFT SHOULDER REGION: ICD-10-CM

## 2023-09-29 PROCEDURE — 73200 CT UPPER EXTREMITY W/O DYE: CPT

## 2023-11-01 ENCOUNTER — HOSPITAL ENCOUNTER (OUTPATIENT)
Facility: HOSPITAL | Age: 58
Discharge: HOME | End: 2023-11-04
Payer: COMMERCIAL

## 2023-11-01 VITALS
BODY MASS INDEX: 33.34 KG/M2 | SYSTOLIC BLOOD PRESSURE: 142 MMHG | OXYGEN SATURATION: 98 % | TEMPERATURE: 98.3 F | HEART RATE: 84 BPM | DIASTOLIC BLOOD PRESSURE: 75 MMHG | HEIGHT: 70 IN | RESPIRATION RATE: 18 BRPM | WEIGHT: 232.9 LBS

## 2023-11-01 LAB
ABO + RH BLD: NORMAL
ALBUMIN SERPL-MCNC: 3.8 G/DL (ref 3.5–5)
ALBUMIN/GLOB SERPL: 0.9 (ref 1.1–2.2)
ALP SERPL-CCNC: 66 U/L (ref 45–117)
ALT SERPL-CCNC: 47 U/L (ref 12–78)
ANION GAP SERPL CALC-SCNC: 4 MMOL/L (ref 5–15)
APPEARANCE UR: ABNORMAL
AST SERPL-CCNC: 35 U/L (ref 15–37)
BACTERIA URNS QL MICRO: NEGATIVE /HPF
BILIRUB SERPL-MCNC: 0.3 MG/DL (ref 0.2–1)
BILIRUB UR QL: NEGATIVE
BLOOD GROUP ANTIBODIES SERPL: NORMAL
BUN SERPL-MCNC: 19 MG/DL (ref 6–20)
BUN/CREAT SERPL: 15 (ref 12–20)
CALCIUM SERPL-MCNC: 9.4 MG/DL (ref 8.5–10.1)
CHLORIDE SERPL-SCNC: 107 MMOL/L (ref 97–108)
CO2 SERPL-SCNC: 24 MMOL/L (ref 21–32)
COLOR UR: ABNORMAL
CREAT SERPL-MCNC: 1.29 MG/DL (ref 0.7–1.3)
EPITH CASTS URNS QL MICRO: ABNORMAL /LPF
ERYTHROCYTE [DISTWIDTH] IN BLOOD BY AUTOMATED COUNT: 17.4 % (ref 11.5–14.5)
GLOBULIN SER CALC-MCNC: 4.2 G/DL (ref 2–4)
GLUCOSE SERPL-MCNC: 94 MG/DL (ref 65–100)
GLUCOSE UR STRIP.AUTO-MCNC: NEGATIVE MG/DL
HCT VFR BLD AUTO: 50.1 % (ref 36.6–50.3)
HGB BLD-MCNC: 17.3 G/DL (ref 12.1–17)
HGB UR QL STRIP: NEGATIVE
HYALINE CASTS URNS QL MICRO: ABNORMAL /LPF (ref 0–2)
INR PPP: 1 (ref 0.9–1.1)
KETONES UR QL STRIP.AUTO: NEGATIVE MG/DL
LEUKOCYTE ESTERASE UR QL STRIP.AUTO: NEGATIVE
MCH RBC QN AUTO: 29.3 PG (ref 26–34)
MCHC RBC AUTO-ENTMCNC: 34.5 G/DL (ref 30–36.5)
MCV RBC AUTO: 84.9 FL (ref 80–99)
NITRITE UR QL STRIP.AUTO: NEGATIVE
NRBC # BLD: 0 K/UL (ref 0–0.01)
NRBC BLD-RTO: 0 PER 100 WBC
PH UR STRIP: 7 (ref 5–8)
PLATELET # BLD AUTO: 342 K/UL (ref 150–400)
PMV BLD AUTO: 10.4 FL (ref 8.9–12.9)
POTASSIUM SERPL-SCNC: 4.2 MMOL/L (ref 3.5–5.1)
PROT SERPL-MCNC: 8 G/DL (ref 6.4–8.2)
PROT UR STRIP-MCNC: NEGATIVE MG/DL
PROTHROMBIN TIME: 10.3 SEC (ref 9–11.1)
RBC # BLD AUTO: 5.9 M/UL (ref 4.1–5.7)
RBC #/AREA URNS HPF: ABNORMAL /HPF (ref 0–5)
SODIUM SERPL-SCNC: 135 MMOL/L (ref 136–145)
SP GR UR REFRACTOMETRY: 1.02
SPECIMEN EXP DATE BLD: NORMAL
URINE CULTURE IF INDICATED: ABNORMAL
UROBILINOGEN UR QL STRIP.AUTO: 1 EU/DL (ref 0.2–1)
WBC # BLD AUTO: 10.4 K/UL (ref 4.1–11.1)
WBC URNS QL MICRO: ABNORMAL /HPF (ref 0–4)

## 2023-11-01 PROCEDURE — 36415 COLL VENOUS BLD VENIPUNCTURE: CPT

## 2023-11-01 PROCEDURE — 81001 URINALYSIS AUTO W/SCOPE: CPT

## 2023-11-01 PROCEDURE — 86901 BLOOD TYPING SEROLOGIC RH(D): CPT

## 2023-11-01 PROCEDURE — 97165 OT EVAL LOW COMPLEX 30 MIN: CPT | Performed by: OCCUPATIONAL THERAPIST

## 2023-11-01 PROCEDURE — 80053 COMPREHEN METABOLIC PANEL: CPT

## 2023-11-01 PROCEDURE — 86850 RBC ANTIBODY SCREEN: CPT

## 2023-11-01 PROCEDURE — 85610 PROTHROMBIN TIME: CPT

## 2023-11-01 PROCEDURE — 82985 ASSAY OF GLYCATED PROTEIN: CPT

## 2023-11-01 PROCEDURE — 86900 BLOOD TYPING SEROLOGIC ABO: CPT

## 2023-11-01 PROCEDURE — 97535 SELF CARE MNGMENT TRAINING: CPT | Performed by: OCCUPATIONAL THERAPIST

## 2023-11-01 PROCEDURE — 85027 COMPLETE CBC AUTOMATED: CPT

## 2023-11-01 RX ORDER — SENNOSIDES 8.6 MG
650 CAPSULE ORAL EVERY 8 HOURS PRN
COMMUNITY

## 2023-11-01 RX ORDER — LOSARTAN POTASSIUM 100 MG/1
100 TABLET ORAL NIGHTLY
COMMUNITY

## 2023-11-01 RX ORDER — DICLOFENAC SODIUM 75 MG/1
75 TABLET, DELAYED RELEASE ORAL 2 TIMES DAILY
COMMUNITY

## 2023-11-01 ASSESSMENT — PAIN SCALES - GENERAL: PAINLEVEL_OUTOF10: 5

## 2023-11-01 ASSESSMENT — PAIN DESCRIPTION - LOCATION: LOCATION: SHOULDER

## 2023-11-01 ASSESSMENT — PAIN DESCRIPTION - ORIENTATION: ORIENTATION: LEFT

## 2023-11-01 ASSESSMENT — PAIN DESCRIPTION - DESCRIPTORS: DESCRIPTORS: ACHING

## 2023-11-02 LAB
BACTERIA SPEC CULT: NORMAL
BACTERIA SPEC CULT: NORMAL
SERVICE CMNT-IMP: NORMAL

## 2023-11-03 LAB
EKG ATRIAL RATE: 80 BPM
EKG DIAGNOSIS: NORMAL
EKG P AXIS: 51 DEGREES
EKG P-R INTERVAL: 148 MS
EKG Q-T INTERVAL: 352 MS
EKG QRS DURATION: 94 MS
EKG QTC CALCULATION (BAZETT): 405 MS
EKG R AXIS: 25 DEGREES
EKG T AXIS: 22 DEGREES
EKG VENTRICULAR RATE: 80 BPM
FRUCTOSAMINE SERPL-SCNC: 213 UMOL/L (ref 0–285)

## 2023-11-14 ENCOUNTER — ANESTHESIA EVENT (OUTPATIENT)
Facility: HOSPITAL | Age: 58
End: 2023-11-14
Payer: COMMERCIAL

## 2023-11-15 ENCOUNTER — HOSPITAL ENCOUNTER (OUTPATIENT)
Facility: HOSPITAL | Age: 58
Setting detail: OUTPATIENT SURGERY
Discharge: HOME | End: 2023-11-15
Attending: ORTHOPAEDIC SURGERY | Admitting: ORTHOPAEDIC SURGERY
Payer: COMMERCIAL

## 2023-11-15 ENCOUNTER — ANESTHESIA (OUTPATIENT)
Facility: HOSPITAL | Age: 58
End: 2023-11-15
Payer: COMMERCIAL

## 2023-11-15 ENCOUNTER — APPOINTMENT (OUTPATIENT)
Facility: HOSPITAL | Age: 58
End: 2023-11-15
Attending: ORTHOPAEDIC SURGERY
Payer: COMMERCIAL

## 2023-11-15 VITALS
BODY MASS INDEX: 32.64 KG/M2 | OXYGEN SATURATION: 95 % | TEMPERATURE: 97.9 F | WEIGHT: 228 LBS | DIASTOLIC BLOOD PRESSURE: 65 MMHG | RESPIRATION RATE: 25 BRPM | HEIGHT: 70 IN | SYSTOLIC BLOOD PRESSURE: 119 MMHG | HEART RATE: 106 BPM

## 2023-11-15 DIAGNOSIS — M19.012 ARTHRITIS OF LEFT GLENOHUMERAL JOINT: Primary | ICD-10-CM

## 2023-11-15 DIAGNOSIS — M67.922 TENDINOPATHY OF LEFT BICEPS TENDON: ICD-10-CM

## 2023-11-15 PROCEDURE — 2709999900 HC NON-CHARGEABLE SUPPLY: Performed by: ORTHOPAEDIC SURGERY

## 2023-11-15 PROCEDURE — 6360000002 HC RX W HCPCS: Performed by: ANESTHESIOLOGY

## 2023-11-15 PROCEDURE — 2580000003 HC RX 258: Performed by: NURSE ANESTHETIST, CERTIFIED REGISTERED

## 2023-11-15 PROCEDURE — 7100000000 HC PACU RECOVERY - FIRST 15 MIN: Performed by: ORTHOPAEDIC SURGERY

## 2023-11-15 PROCEDURE — 7100000001 HC PACU RECOVERY - ADDTL 15 MIN: Performed by: ORTHOPAEDIC SURGERY

## 2023-11-15 PROCEDURE — 7100000011 HC PHASE II RECOVERY - ADDTL 15 MIN: Performed by: ORTHOPAEDIC SURGERY

## 2023-11-15 PROCEDURE — 3600000015 HC SURGERY LEVEL 5 ADDTL 15MIN: Performed by: ORTHOPAEDIC SURGERY

## 2023-11-15 PROCEDURE — C9290 INJ, BUPIVACAINE LIPOSOME: HCPCS | Performed by: ORTHOPAEDIC SURGERY

## 2023-11-15 PROCEDURE — C9290 INJ, BUPIVACAINE LIPOSOME: HCPCS | Performed by: ANESTHESIOLOGY

## 2023-11-15 PROCEDURE — 64415 NJX AA&/STRD BRCH PLXS IMG: CPT | Performed by: ANESTHESIOLOGY

## 2023-11-15 PROCEDURE — 6360000002 HC RX W HCPCS: Performed by: ORTHOPAEDIC SURGERY

## 2023-11-15 PROCEDURE — 3600000005 HC SURGERY LEVEL 5 BASE: Performed by: ORTHOPAEDIC SURGERY

## 2023-11-15 PROCEDURE — 2580000003 HC RX 258: Performed by: ORTHOPAEDIC SURGERY

## 2023-11-15 PROCEDURE — C1713 ANCHOR/SCREW BN/BN,TIS/BN: HCPCS | Performed by: ORTHOPAEDIC SURGERY

## 2023-11-15 PROCEDURE — 6360000002 HC RX W HCPCS: Performed by: NURSE ANESTHETIST, CERTIFIED REGISTERED

## 2023-11-15 PROCEDURE — 3700000001 HC ADD 15 MINUTES (ANESTHESIA): Performed by: ORTHOPAEDIC SURGERY

## 2023-11-15 PROCEDURE — 3700000000 HC ANESTHESIA ATTENDED CARE: Performed by: ORTHOPAEDIC SURGERY

## 2023-11-15 PROCEDURE — 2580000003 HC RX 258: Performed by: ANESTHESIOLOGY

## 2023-11-15 PROCEDURE — 7100000010 HC PHASE II RECOVERY - FIRST 15 MIN: Performed by: ORTHOPAEDIC SURGERY

## 2023-11-15 PROCEDURE — A4217 STERILE WATER/SALINE, 500 ML: HCPCS | Performed by: ORTHOPAEDIC SURGERY

## 2023-11-15 PROCEDURE — 2500000003 HC RX 250 WO HCPCS: Performed by: NURSE ANESTHETIST, CERTIFIED REGISTERED

## 2023-11-15 RX ORDER — BUPIVACAINE HYDROCHLORIDE 5 MG/ML
INJECTION, SOLUTION EPIDURAL; INTRACAUDAL
Status: COMPLETED | OUTPATIENT
Start: 2023-11-15 | End: 2023-11-15

## 2023-11-15 RX ORDER — PROPOFOL 10 MG/ML
INJECTION, EMULSION INTRAVENOUS PRN
Status: DISCONTINUED | OUTPATIENT
Start: 2023-11-15 | End: 2023-11-15 | Stop reason: SDUPTHER

## 2023-11-15 RX ORDER — DROPERIDOL 2.5 MG/ML
0.62 INJECTION, SOLUTION INTRAMUSCULAR; INTRAVENOUS
Status: DISCONTINUED | OUTPATIENT
Start: 2023-11-15 | End: 2023-11-15 | Stop reason: HOSPADM

## 2023-11-15 RX ORDER — ROCURONIUM BROMIDE 10 MG/ML
INJECTION, SOLUTION INTRAVENOUS PRN
Status: DISCONTINUED | OUTPATIENT
Start: 2023-11-15 | End: 2023-11-15 | Stop reason: SDUPTHER

## 2023-11-15 RX ORDER — HYDROMORPHONE HYDROCHLORIDE 1 MG/ML
0.5 INJECTION, SOLUTION INTRAMUSCULAR; INTRAVENOUS; SUBCUTANEOUS EVERY 5 MIN PRN
Status: DISCONTINUED | OUTPATIENT
Start: 2023-11-15 | End: 2023-11-15 | Stop reason: HOSPADM

## 2023-11-15 RX ORDER — OXYCODONE HYDROCHLORIDE 5 MG/1
5 TABLET ORAL
Status: DISCONTINUED | OUTPATIENT
Start: 2023-11-15 | End: 2023-11-15 | Stop reason: HOSPADM

## 2023-11-15 RX ORDER — TRANEXAMIC ACID 100 MG/ML
INJECTION, SOLUTION INTRAVENOUS
Status: COMPLETED
Start: 2023-11-15 | End: 2023-11-15

## 2023-11-15 RX ORDER — DIPHENHYDRAMINE HYDROCHLORIDE 50 MG/ML
12.5 INJECTION INTRAMUSCULAR; INTRAVENOUS
Status: DISCONTINUED | OUTPATIENT
Start: 2023-11-15 | End: 2023-11-15 | Stop reason: HOSPADM

## 2023-11-15 RX ORDER — GLYCOPYRROLATE 0.2 MG/ML
INJECTION INTRAMUSCULAR; INTRAVENOUS PRN
Status: DISCONTINUED | OUTPATIENT
Start: 2023-11-15 | End: 2023-11-15 | Stop reason: SDUPTHER

## 2023-11-15 RX ORDER — SODIUM CHLORIDE 0.9 % (FLUSH) 0.9 %
5-40 SYRINGE (ML) INJECTION PRN
Status: DISCONTINUED | OUTPATIENT
Start: 2023-11-15 | End: 2023-11-15 | Stop reason: HOSPADM

## 2023-11-15 RX ORDER — GABAPENTIN 100 MG/1
100 CAPSULE ORAL 3 TIMES DAILY
Qty: 90 CAPSULE | Refills: 0 | Status: SHIPPED | OUTPATIENT
Start: 2023-11-15 | End: 2023-12-15

## 2023-11-15 RX ORDER — MIDAZOLAM HYDROCHLORIDE 1 MG/ML
INJECTION INTRAMUSCULAR; INTRAVENOUS
Status: COMPLETED
Start: 2023-11-15 | End: 2023-11-15

## 2023-11-15 RX ORDER — FENTANYL CITRATE 50 UG/ML
25 INJECTION, SOLUTION INTRAMUSCULAR; INTRAVENOUS EVERY 5 MIN PRN
Status: DISCONTINUED | OUTPATIENT
Start: 2023-11-15 | End: 2023-11-15 | Stop reason: HOSPADM

## 2023-11-15 RX ORDER — MAGNESIUM HYDROXIDE 1200 MG/15ML
LIQUID ORAL CONTINUOUS PRN
Status: COMPLETED | OUTPATIENT
Start: 2023-11-15 | End: 2023-11-15

## 2023-11-15 RX ORDER — DEXAMETHASONE SODIUM PHOSPHATE 4 MG/ML
INJECTION, SOLUTION INTRA-ARTICULAR; INTRALESIONAL; INTRAMUSCULAR; INTRAVENOUS; SOFT TISSUE PRN
Status: DISCONTINUED | OUTPATIENT
Start: 2023-11-15 | End: 2023-11-15 | Stop reason: SDUPTHER

## 2023-11-15 RX ORDER — SODIUM CHLORIDE, SODIUM LACTATE, POTASSIUM CHLORIDE, CALCIUM CHLORIDE 600; 310; 30; 20 MG/100ML; MG/100ML; MG/100ML; MG/100ML
INJECTION, SOLUTION INTRAVENOUS CONTINUOUS
Status: DISCONTINUED | OUTPATIENT
Start: 2023-11-15 | End: 2023-11-15 | Stop reason: HOSPADM

## 2023-11-15 RX ORDER — VANCOMYCIN HYDROCHLORIDE 1 G/20ML
INJECTION, POWDER, LYOPHILIZED, FOR SOLUTION INTRAVENOUS PRN
Status: DISCONTINUED | OUTPATIENT
Start: 2023-11-15 | End: 2023-11-15 | Stop reason: ALTCHOICE

## 2023-11-15 RX ORDER — ONDANSETRON 4 MG/1
4 TABLET, ORALLY DISINTEGRATING ORAL EVERY 8 HOURS PRN
Qty: 10 TABLET | Refills: 0 | Status: SHIPPED | OUTPATIENT
Start: 2023-11-15

## 2023-11-15 RX ORDER — LIDOCAINE HYDROCHLORIDE 10 MG/ML
1 INJECTION, SOLUTION EPIDURAL; INFILTRATION; INTRACAUDAL; PERINEURAL
Status: DISCONTINUED | OUTPATIENT
Start: 2023-11-15 | End: 2023-11-15 | Stop reason: HOSPADM

## 2023-11-15 RX ORDER — WATER 10 ML/10ML
INJECTION INTRAMUSCULAR; INTRAVENOUS; SUBCUTANEOUS
Status: DISCONTINUED
Start: 2023-11-15 | End: 2023-11-15 | Stop reason: HOSPADM

## 2023-11-15 RX ORDER — SODIUM CHLORIDE 9 MG/ML
INJECTION, SOLUTION INTRAVENOUS PRN
Status: DISCONTINUED | OUTPATIENT
Start: 2023-11-15 | End: 2023-11-15 | Stop reason: HOSPADM

## 2023-11-15 RX ORDER — CEFAZOLIN SODIUM 1 G/3ML
INJECTION, POWDER, FOR SOLUTION INTRAMUSCULAR; INTRAVENOUS
Status: DISCONTINUED
Start: 2023-11-15 | End: 2023-11-15 | Stop reason: HOSPADM

## 2023-11-15 RX ORDER — BUPIVACAINE HYDROCHLORIDE 5 MG/ML
INJECTION, SOLUTION EPIDURAL; INTRACAUDAL
Status: COMPLETED
Start: 2023-11-15 | End: 2023-11-15

## 2023-11-15 RX ORDER — MIDAZOLAM HYDROCHLORIDE 1 MG/ML
INJECTION INTRAMUSCULAR; INTRAVENOUS
Status: COMPLETED | OUTPATIENT
Start: 2023-11-15 | End: 2023-11-15

## 2023-11-15 RX ORDER — KETOROLAC TROMETHAMINE 10 MG/1
10 TABLET, FILM COATED ORAL EVERY 6 HOURS PRN
Qty: 12 TABLET | Refills: 0 | Status: SHIPPED | OUTPATIENT
Start: 2023-11-15 | End: 2023-11-18

## 2023-11-15 RX ORDER — PHENYLEPHRINE HCL IN 0.9% NACL 0.4MG/10ML
SYRINGE (ML) INTRAVENOUS PRN
Status: DISCONTINUED | OUTPATIENT
Start: 2023-11-15 | End: 2023-11-15 | Stop reason: SDUPTHER

## 2023-11-15 RX ORDER — CYCLOBENZAPRINE HCL 5 MG
5 TABLET ORAL 3 TIMES DAILY PRN
Qty: 30 TABLET | Refills: 0 | Status: SHIPPED | OUTPATIENT
Start: 2023-11-15

## 2023-11-15 RX ORDER — LIDOCAINE HYDROCHLORIDE 20 MG/ML
INJECTION, SOLUTION EPIDURAL; INFILTRATION; INTRACAUDAL; PERINEURAL PRN
Status: DISCONTINUED | OUTPATIENT
Start: 2023-11-15 | End: 2023-11-15 | Stop reason: SDUPTHER

## 2023-11-15 RX ORDER — SUCCINYLCHOLINE/SOD CL,ISO/PF 200MG/10ML
SYRINGE (ML) INTRAVENOUS PRN
Status: DISCONTINUED | OUTPATIENT
Start: 2023-11-15 | End: 2023-11-15 | Stop reason: SDUPTHER

## 2023-11-15 RX ORDER — OXYCODONE HYDROCHLORIDE 5 MG/1
5 TABLET ORAL EVERY 4 HOURS PRN
Qty: 20 TABLET | Refills: 0 | Status: SHIPPED | OUTPATIENT
Start: 2023-11-15 | End: 2023-11-22

## 2023-11-15 RX ORDER — EPHEDRINE SULFATE/0.9% NACL/PF 50 MG/5 ML
SYRINGE (ML) INTRAVENOUS PRN
Status: DISCONTINUED | OUTPATIENT
Start: 2023-11-15 | End: 2023-11-15 | Stop reason: SDUPTHER

## 2023-11-15 RX ORDER — ACETAMINOPHEN 500 MG
1000 TABLET ORAL EVERY 8 HOURS PRN
Qty: 180 TABLET | Refills: 0 | Status: SHIPPED | OUTPATIENT
Start: 2023-11-15

## 2023-11-15 RX ORDER — TRANEXAMIC ACID 100 MG/ML
INJECTION, SOLUTION INTRAVENOUS PRN
Status: DISCONTINUED | OUTPATIENT
Start: 2023-11-15 | End: 2023-11-15 | Stop reason: SDUPTHER

## 2023-11-15 RX ORDER — FENTANYL CITRATE 50 UG/ML
INJECTION, SOLUTION INTRAMUSCULAR; INTRAVENOUS PRN
Status: DISCONTINUED | OUTPATIENT
Start: 2023-11-15 | End: 2023-11-15 | Stop reason: SDUPTHER

## 2023-11-15 RX ORDER — MEPERIDINE HYDROCHLORIDE 25 MG/ML
12.5 INJECTION INTRAMUSCULAR; INTRAVENOUS; SUBCUTANEOUS EVERY 5 MIN PRN
Status: DISCONTINUED | OUTPATIENT
Start: 2023-11-15 | End: 2023-11-15 | Stop reason: HOSPADM

## 2023-11-15 RX ORDER — ONDANSETRON 2 MG/ML
INJECTION INTRAMUSCULAR; INTRAVENOUS PRN
Status: DISCONTINUED | OUTPATIENT
Start: 2023-11-15 | End: 2023-11-15 | Stop reason: SDUPTHER

## 2023-11-15 RX ORDER — ASPIRIN 81 MG/1
81 TABLET, CHEWABLE ORAL DAILY
Qty: 30 TABLET | Refills: 0 | Status: SHIPPED | OUTPATIENT
Start: 2023-11-15 | End: 2023-12-15

## 2023-11-15 RX ORDER — KETOROLAC TROMETHAMINE 30 MG/ML
30 INJECTION, SOLUTION INTRAMUSCULAR; INTRAVENOUS
Status: DISCONTINUED | OUTPATIENT
Start: 2023-11-15 | End: 2023-11-15 | Stop reason: HOSPADM

## 2023-11-15 RX ADMIN — PHENYLEPHRINE HYDROCHLORIDE 80 MCG/MIN: 10 INJECTION INTRAVENOUS at 10:52

## 2023-11-15 RX ADMIN — Medication 80 MCG: at 08:06

## 2023-11-15 RX ADMIN — PROPOFOL 50 MG: 10 INJECTION, EMULSION INTRAVENOUS at 11:56

## 2023-11-15 RX ADMIN — FENTANYL CITRATE 50 MCG: 50 INJECTION, SOLUTION INTRAMUSCULAR; INTRAVENOUS at 09:14

## 2023-11-15 RX ADMIN — Medication 5 MG: at 10:25

## 2023-11-15 RX ADMIN — PHENYLEPHRINE HYDROCHLORIDE 40 MCG/MIN: 10 INJECTION INTRAVENOUS at 08:20

## 2023-11-15 RX ADMIN — PHENYLEPHRINE HYDROCHLORIDE 120 MCG: 10 INJECTION INTRAVENOUS at 11:09

## 2023-11-15 RX ADMIN — PHENYLEPHRINE HYDROCHLORIDE 80 MCG: 10 INJECTION INTRAVENOUS at 11:03

## 2023-11-15 RX ADMIN — TRANEXAMIC ACID 1000 MG: 100 INJECTION, SOLUTION INTRAVENOUS at 07:50

## 2023-11-15 RX ADMIN — PHENYLEPHRINE HYDROCHLORIDE 80 MCG: 10 INJECTION INTRAVENOUS at 10:54

## 2023-11-15 RX ADMIN — SODIUM CHLORIDE, POTASSIUM CHLORIDE, SODIUM LACTATE AND CALCIUM CHLORIDE: 600; 310; 30; 20 INJECTION, SOLUTION INTRAVENOUS at 06:35

## 2023-11-15 RX ADMIN — Medication 120 MCG: at 08:16

## 2023-11-15 RX ADMIN — DEXAMETHASONE SODIUM PHOSPHATE 8 MG: 4 INJECTION, SOLUTION INTRAMUSCULAR; INTRAVENOUS at 07:45

## 2023-11-15 RX ADMIN — SODIUM CHLORIDE, POTASSIUM CHLORIDE, SODIUM LACTATE AND CALCIUM CHLORIDE: 600; 310; 30; 20 INJECTION, SOLUTION INTRAVENOUS at 10:00

## 2023-11-15 RX ADMIN — LIDOCAINE HYDROCHLORIDE 100 MG: 20 INJECTION, SOLUTION EPIDURAL; INFILTRATION; INTRACAUDAL; PERINEURAL at 07:36

## 2023-11-15 RX ADMIN — ROCURONIUM BROMIDE 20 MG: 10 INJECTION INTRAVENOUS at 10:42

## 2023-11-15 RX ADMIN — ROCURONIUM BROMIDE 20 MG: 10 INJECTION INTRAVENOUS at 09:09

## 2023-11-15 RX ADMIN — ROCURONIUM BROMIDE 20 MG: 10 INJECTION INTRAVENOUS at 08:30

## 2023-11-15 RX ADMIN — ROCURONIUM BROMIDE 40 MG: 10 INJECTION INTRAVENOUS at 07:45

## 2023-11-15 RX ADMIN — BUPIVACAINE 10 ML: 13.3 INJECTION, SUSPENSION, LIPOSOMAL INFILTRATION at 07:19

## 2023-11-15 RX ADMIN — Medication 80 MCG: at 11:45

## 2023-11-15 RX ADMIN — Medication 80 MCG: at 07:54

## 2023-11-15 RX ADMIN — PROPOFOL 50 MG: 10 INJECTION, EMULSION INTRAVENOUS at 07:38

## 2023-11-15 RX ADMIN — ONDANSETRON 4 MG: 2 INJECTION INTRAMUSCULAR; INTRAVENOUS at 11:20

## 2023-11-15 RX ADMIN — Medication 80 MCG: at 12:00

## 2023-11-15 RX ADMIN — ROCURONIUM BROMIDE 10 MG: 10 INJECTION INTRAVENOUS at 07:36

## 2023-11-15 RX ADMIN — WATER 2000 MG: 1 INJECTION INTRAMUSCULAR; INTRAVENOUS; SUBCUTANEOUS at 07:30

## 2023-11-15 RX ADMIN — BUPIVACAINE HYDROCHLORIDE 15 ML: 5 INJECTION, SOLUTION EPIDURAL; INTRACAUDAL; PERINEURAL at 07:19

## 2023-11-15 RX ADMIN — SUGAMMADEX 200 MG: 100 INJECTION, SOLUTION INTRAVENOUS at 12:00

## 2023-11-15 RX ADMIN — FENTANYL CITRATE 50 MCG: 50 INJECTION, SOLUTION INTRAMUSCULAR; INTRAVENOUS at 08:39

## 2023-11-15 RX ADMIN — PROPOFOL 150 MG: 10 INJECTION, EMULSION INTRAVENOUS at 07:36

## 2023-11-15 RX ADMIN — Medication 5 MG: at 10:16

## 2023-11-15 RX ADMIN — Medication 40 MCG: at 08:13

## 2023-11-15 RX ADMIN — Medication 80 MCG: at 07:59

## 2023-11-15 RX ADMIN — WATER 2000 MG: 1 INJECTION INTRAMUSCULAR; INTRAVENOUS; SUBCUTANEOUS at 11:19

## 2023-11-15 RX ADMIN — TRANEXAMIC ACID 1000 MG: 100 INJECTION, SOLUTION INTRAVENOUS at 11:15

## 2023-11-15 RX ADMIN — MIDAZOLAM HYDROCHLORIDE 3 MG: 1 INJECTION, SOLUTION INTRAMUSCULAR; INTRAVENOUS at 07:15

## 2023-11-15 RX ADMIN — PROPOFOL 30 MG: 10 INJECTION, EMULSION INTRAVENOUS at 11:37

## 2023-11-15 RX ADMIN — ROCURONIUM BROMIDE 10 MG: 10 INJECTION INTRAVENOUS at 09:39

## 2023-11-15 RX ADMIN — Medication 160 MG: at 07:37

## 2023-11-15 RX ADMIN — GLYCOPYRROLATE 0.2 MG: 0.2 INJECTION INTRAMUSCULAR; INTRAVENOUS at 08:19

## 2023-11-15 RX ADMIN — ROCURONIUM BROMIDE 10 MG: 10 INJECTION INTRAVENOUS at 10:00

## 2023-11-15 ASSESSMENT — PAIN DESCRIPTION - DESCRIPTORS: DESCRIPTORS: ACHING

## 2023-11-15 ASSESSMENT — PAIN - FUNCTIONAL ASSESSMENT: PAIN_FUNCTIONAL_ASSESSMENT: 0-10

## 2023-11-15 NOTE — PERIOP NOTE
Ralph Gill  1965  [id number]    Situation:  Verbal report given from:   Procedure: Procedure(s):  LEFT TOTAL SHOULDER ARTHROPLASTY WITH OPEN BICEPS TENODESIS    Background:    Preoperative diagnosis: Arthritis of left shoulder region [M19.012]    Postoperative diagnosis: * No post-op diagnosis entered *    :  Dr. Regan Dalton    Assistant(s): Circulator: Rory Mcconnell RN  Surgical Assistant: Bell Max  Radiology Technologist: Vertie Cogan Person First: Cristofer Ferrell    Specimens: * No specimens in log *    Assessment:  Intra-procedure medications         Anesthesia gave intra-procedure sedation and medications, see anesthesia flow sheet     Intravenous fluids: LR@ KVO     Vital signs stable       Recommendation:    Permission to share finding with family

## 2023-11-15 NOTE — PERIOP NOTE
1218 Pt arrived to PACU. Pt drowsy. VSS. Dressing on Left shoulder CDI. Left arm warm and pink. Strong left radial pulse. Pt moving left hand. 1225 Gel ice pack placed on left shoulder. 1250 Discharge instructions reviewed face to face with wife. 1310 Pt has urge to void. Pt dressed and Sling placed. Pt walked to the bathroom and voided. 1320 Pt alert and oriented. Pt denies pain. VSS. Left arm warm and pink. Pt transferred to car via wheelchair.

## 2023-11-15 NOTE — ANESTHESIA POSTPROCEDURE EVALUATION
Department of Anesthesiology  Postprocedure Note    Patient: Brad Baron  MRN: [id number]  YOB: 1965  Date of evaluation: 11/15/2023      Procedure Summary     Date: 11/15/23 Room / Location: [facility] ASU  / [facility] AMBULATORY OR    Anesthesia Start: 2918 Anesthesia Stop: 6199    Procedure: LEFT TOTAL SHOULDER ARTHROPLASTY WITH OPEN BICEPS TENODESIS (Left: Shoulder) Diagnosis:       Arthritis of left shoulder region      (Arthritis of left shoulder region [M19.012])    Surgeons: Stephanie Goodson MD Responsible Provider: Kaz Lorenzo MD    Anesthesia Type: General, Regional ASA Status: 2          Anesthesia Type: General, Regional    Corine Phase I: Corine Score: 8    Corine Phase II: Corine Score: 9      Anesthesia Post Evaluation    Patient location during evaluation: PACU  Patient participation: complete - patient participated  Level of consciousness: awake and alert  Pain score: 0  Airway patency: patent  Nausea & Vomiting: no nausea and no vomiting  Complications: no  Cardiovascular status: hemodynamically stable  Respiratory status: acceptable  Hydration status: euvolemic  Comments: Pt has Interscalene block with Exparel  Multimodal analgesia pain management approach  Pain management: satisfactory to patient

## 2023-11-15 NOTE — ANESTHESIA PROCEDURE NOTES
Peripheral Block    Patient location during procedure: pre-op  Reason for block: post-op pain management and at surgeon's request  Start time: 11/15/2023 7:14 AM  End time: 11/15/2023 7:19 AM  Staffing  Performed: anesthesiologist   Anesthesiologist: Meka Monroe MD  Performed by: Meka Monroe MD  Authorized by: Meka Monroe MD    Preanesthetic Checklist  Completed: patient identified, IV checked, site marked, risks and benefits discussed, surgical/procedural consents, equipment checked, pre-op evaluation, timeout performed, anesthesia consent given, oxygen available, monitors applied/VS acknowledged, fire risk safety assessment completed and verbalized and blood product R/B/A discussed and consented  Peripheral Block   Patient position: sitting  Prep: ChloraPrep  Provider prep: mask and sterile gloves  Patient monitoring: cardiac monitor, continuous pulse ox, frequent blood pressure checks, IV access, oxygen and responsive to questions  Block type: Brachial plexus  Interscalene  Laterality: left  Injection technique: single-shot  Guidance: ultrasound guided    Needle   Needle type: insulated echogenic nerve stimulator needle   Needle gauge: 22 G  Needle localization: ultrasound guidance  Needle length: 5 cm  Assessment   Injection assessment: negative aspiration for heme, no paresthesia on injection, local visualized surrounding nerve on ultrasound and no intravascular symptoms  Paresthesia pain: none  Slow fractionated injection: yes  Hemodynamics: stable  Real-time US image taken/store: yes  Outcomes: uncomplicated and patient tolerated procedure well    Medications Administered  midazolam (VERSED) injection 2 mg/2mL - IntraVENous   3 mg - 11/15/2023 7:15:00 AM  bupivacaine (MARCAINE) PF injection 0.5% - Perineural, Shoulder Left   15 mL - 11/15/2023 7:19:00 AM  bupivacaine liposome (EXPAREL) injection 1.3% - Perineural, Shoulder Left   10 mL - 11/15/2023 7:19:00 AM

## 2023-11-15 NOTE — PROGRESS NOTES
Air Warming blanket placed on pt; turned on for comfort    Permission received to review discharge instructions and discuss private health information with wife   and will have someone with them after discharge       Dr. Royer Sky performed a SHIRA Extremity   nerve block with the U/S machine. PT  was on cardiac monitoring, pulse oximetry and 3L NC O2.  Pt. Was given  3 mg of versed l IV for sedation. VSS. Pt.  Slightly drowsy easy to arouse  post block.     T.O. 0112

## 2023-11-15 NOTE — BRIEF OP NOTE
Brief Postoperative Note      Patient: Rafi Cervantes  YOB: 1965  MRN: [id number]    Date of Procedure: 11/15/2023    Pre-Op Diagnosis Codes:     * Arthritis of left shoulder region [M19.012]    Post-Op Diagnosis: Post-Op Diagnosis Codes:     * Arthritis of left shoulder region [M19.012]       Procedure(s):  LEFT TOTAL SHOULDER ARTHROPLASTY WITH OPEN BICEPS TENODESIS    Surgeon(s):  Ry Mcfarlane MD    Assistant:  Surgical Assistant: Gabriel Campuzano    Anesthesia: General    Estimated Blood Loss (mL): 642    Complications: None    Specimens:   * No specimens in log *    Implants:  Implant Name Type Inv. Item Serial No.  Lot No. LRB No. Used Action   COMPONENT MARIZA FIX M [id number] BACKSIDE RAD CORTILOC PEGGED - [id number]  COMPONENT MARIZA FIX M [id number] BACKSIDE RAD CORTILOC PEGGED [id number] [employer]-WD N/A Left 1 Implanted   CEMENT BNE 40GM W/ GENT HI VISC CO - SN/A  CEMENT BNE 40GM W/ GENT HI VISC CO N/A [employer]WD [id number] Left 1 Implanted   HEAD HUM 13Q70 MM SHLDR SIMPLICITI - [id number]  HEAD HUM 45J00 MM SHLDR SIMPLICITI [id number] [employer]WD N/A Left 1 Implanted   COMPONENT HUM SZ 2 SHLDR NUCLS SIMPLICITI - [id number]  COMPONENT HUM SZ 2 SHLDR [address] [facility] [id number] [address] [place] [address],[address] INC-WD N/A Left 1 Implanted         Drains: * No LDAs found *    Findings: Anatomic total shoulder arthroplasty with the above components. Open biceps tenodesis to the pectoralis major tendon.         Electronically signed by Ry Mcfarlane MD on 11/15/2023 at 11:47 AM

## 2023-11-15 NOTE — DISCHARGE INSTRUCTIONS
Jaylin Underwood, [address], [address] [place] [address] [address]once [address]   [facility]    Phone: [phone], Physical Therapy Phone: [phone]      Post-Operative Instructions after Shoulder Surgery    Please follow these instructions for a safe and speedy recovery:     1. Medication   You may resume all home medications after surgery unless otherwise indicated. Blood thinning medications should be resumed the day after surgery unless otherwise indicated. A combination of the following medications will be prescribed after your surgery. Not all the following medications will be prescribed for every patient after surgery. A narcotic pain medication (oxycodone, hydrocodone, tramadol) - this should be taken sparingly for severe, breakthrough pain only. You should take a stool softener and/or laxative (docusate (Colace), senna (Senekot), etc) while taking narcotics as they can cause severe constipation. Overuse of narcotic pain medications can result in addiction as well as respiratory depression and death. These pain medications are refilled on an individual basis and only during office hours. Acetaminophen (Tylenol) - Do not take if you have liver disease. Do not exceed 3500 mg of acetaminophen in any 24 hour period. Non-steroidal anti-inflammatory medication (NSAID) - take as directed with food. These include ibuprofen (Advil, Motrin), naproxen (Aleve, Naprosyn), meloxicam (Mobic), ketorolac (Toradol), indomethacin (Indocin), and others. If you were prescribed ketorolac (Toradol), you may resume an over-the-counter NSAID after you finish the prescription. You should only take one medication in this class at a time, regardless of whether it is prescribed or available over the counter. Gabapentin (Neurontin) - take 100mg three times daily. This medication can cause drowsiness.   Do not take if you use other medications or

## 2024-08-09 NOTE — ED NOTES
Detail Level: Detailed Discharge instructions reviewed with pt. Discharge instructions given to pt per Dr. Alberto Weinstein. Pt able to return/verbalize discharge instructions. Copy of discharge instructions given. RX given to pt. Pt condition stable, respiratory status within normal limits, neuro status intact. Pt ambulatory, steady gait, out of ER, accompanied by wife. Add 66522 Cpt? (Important Note: In 2017 The Use Of 14769 Is Being Tracked By Cms To Determine Future Global Period Reimbursement For Global Periods): yes